# Patient Record
Sex: FEMALE | Race: ASIAN | NOT HISPANIC OR LATINO | URBAN - METROPOLITAN AREA
[De-identification: names, ages, dates, MRNs, and addresses within clinical notes are randomized per-mention and may not be internally consistent; named-entity substitution may affect disease eponyms.]

---

## 2019-11-26 ENCOUNTER — INPATIENT (INPATIENT)
Facility: HOSPITAL | Age: 74
LOS: 2 days | Discharge: HOME HEALTH | End: 2019-11-29
Attending: INTERNAL MEDICINE | Admitting: INTERNAL MEDICINE
Payer: MEDICARE

## 2019-11-26 VITALS
OXYGEN SATURATION: 100 % | DIASTOLIC BLOOD PRESSURE: 63 MMHG | SYSTOLIC BLOOD PRESSURE: 160 MMHG | HEART RATE: 84 BPM | RESPIRATION RATE: 16 BRPM | TEMPERATURE: 98 F

## 2019-11-26 DIAGNOSIS — E11.9 TYPE 2 DIABETES MELLITUS WITHOUT COMPLICATIONS: ICD-10-CM

## 2019-11-26 DIAGNOSIS — M86.9 OSTEOMYELITIS, UNSPECIFIED: ICD-10-CM

## 2019-11-26 LAB
ALBUMIN SERPL ELPH-MCNC: 4.5 G/DL — SIGNIFICANT CHANGE UP (ref 3.3–5)
ALP SERPL-CCNC: 61 U/L — SIGNIFICANT CHANGE UP (ref 40–120)
ALT FLD-CCNC: 14 U/L — SIGNIFICANT CHANGE UP (ref 4–33)
ANION GAP SERPL CALC-SCNC: 12 MMO/L — SIGNIFICANT CHANGE UP (ref 7–14)
AST SERPL-CCNC: 18 U/L — SIGNIFICANT CHANGE UP (ref 4–32)
BASOPHILS # BLD AUTO: 0.03 K/UL — SIGNIFICANT CHANGE UP (ref 0–0.2)
BASOPHILS NFR BLD AUTO: 0.4 % — SIGNIFICANT CHANGE UP (ref 0–2)
BILIRUB SERPL-MCNC: 0.4 MG/DL — SIGNIFICANT CHANGE UP (ref 0.2–1.2)
BUN SERPL-MCNC: 15 MG/DL — SIGNIFICANT CHANGE UP (ref 7–23)
CALCIUM SERPL-MCNC: 10.1 MG/DL — SIGNIFICANT CHANGE UP (ref 8.4–10.5)
CHLORIDE SERPL-SCNC: 100 MMOL/L — SIGNIFICANT CHANGE UP (ref 98–107)
CO2 SERPL-SCNC: 26 MMOL/L — SIGNIFICANT CHANGE UP (ref 22–31)
CREAT SERPL-MCNC: 0.69 MG/DL — SIGNIFICANT CHANGE UP (ref 0.5–1.3)
CRP SERPL-MCNC: < 4 MG/L — SIGNIFICANT CHANGE UP
EOSINOPHIL # BLD AUTO: 0.04 K/UL — SIGNIFICANT CHANGE UP (ref 0–0.5)
EOSINOPHIL NFR BLD AUTO: 0.6 % — SIGNIFICANT CHANGE UP (ref 0–6)
ERYTHROCYTE [SEDIMENTATION RATE] IN BLOOD: 51 MM/HR — HIGH (ref 4–25)
GLUCOSE BLDC GLUCOMTR-MCNC: 195 MG/DL — HIGH (ref 70–99)
GLUCOSE BLDC GLUCOMTR-MCNC: 221 MG/DL — HIGH (ref 70–99)
GLUCOSE BLDC GLUCOMTR-MCNC: 62 MG/DL — LOW (ref 70–99)
GLUCOSE BLDC GLUCOMTR-MCNC: 64 MG/DL — LOW (ref 70–99)
GLUCOSE BLDC GLUCOMTR-MCNC: 65 MG/DL — LOW (ref 70–99)
GLUCOSE BLDC GLUCOMTR-MCNC: 65 MG/DL — LOW (ref 70–99)
GLUCOSE BLDC GLUCOMTR-MCNC: 67 MG/DL — LOW (ref 70–99)
GLUCOSE BLDC GLUCOMTR-MCNC: 75 MG/DL — SIGNIFICANT CHANGE UP (ref 70–99)
GLUCOSE BLDC GLUCOMTR-MCNC: 78 MG/DL — SIGNIFICANT CHANGE UP (ref 70–99)
GLUCOSE SERPL-MCNC: 133 MG/DL — HIGH (ref 70–99)
HCT VFR BLD CALC: 36.3 % — SIGNIFICANT CHANGE UP (ref 34.5–45)
HGB BLD-MCNC: 11.8 G/DL — SIGNIFICANT CHANGE UP (ref 11.5–15.5)
IMM GRANULOCYTES NFR BLD AUTO: 0.3 % — SIGNIFICANT CHANGE UP (ref 0–1.5)
LACTATE SERPL-SCNC: 1.3 MMOL/L — SIGNIFICANT CHANGE UP (ref 0.5–2)
LYMPHOCYTES # BLD AUTO: 1.14 K/UL — SIGNIFICANT CHANGE UP (ref 1–3.3)
LYMPHOCYTES # BLD AUTO: 17 % — SIGNIFICANT CHANGE UP (ref 13–44)
MCHC RBC-ENTMCNC: 30.3 PG — SIGNIFICANT CHANGE UP (ref 27–34)
MCHC RBC-ENTMCNC: 32.5 % — SIGNIFICANT CHANGE UP (ref 32–36)
MCV RBC AUTO: 93.1 FL — SIGNIFICANT CHANGE UP (ref 80–100)
MONOCYTES # BLD AUTO: 0.32 K/UL — SIGNIFICANT CHANGE UP (ref 0–0.9)
MONOCYTES NFR BLD AUTO: 4.8 % — SIGNIFICANT CHANGE UP (ref 2–14)
NEUTROPHILS # BLD AUTO: 5.14 K/UL — SIGNIFICANT CHANGE UP (ref 1.8–7.4)
NEUTROPHILS NFR BLD AUTO: 76.9 % — SIGNIFICANT CHANGE UP (ref 43–77)
NRBC # FLD: 0 K/UL — SIGNIFICANT CHANGE UP (ref 0–0)
PLATELET # BLD AUTO: 225 K/UL — SIGNIFICANT CHANGE UP (ref 150–400)
PMV BLD: 9.9 FL — SIGNIFICANT CHANGE UP (ref 7–13)
POTASSIUM SERPL-MCNC: 4.6 MMOL/L — SIGNIFICANT CHANGE UP (ref 3.5–5.3)
POTASSIUM SERPL-SCNC: 4.6 MMOL/L — SIGNIFICANT CHANGE UP (ref 3.5–5.3)
PROT SERPL-MCNC: 8.7 G/DL — HIGH (ref 6–8.3)
RBC # BLD: 3.9 M/UL — SIGNIFICANT CHANGE UP (ref 3.8–5.2)
RBC # FLD: 12.8 % — SIGNIFICANT CHANGE UP (ref 10.3–14.5)
SODIUM SERPL-SCNC: 138 MMOL/L — SIGNIFICANT CHANGE UP (ref 135–145)
WBC # BLD: 6.69 K/UL — SIGNIFICANT CHANGE UP (ref 3.8–10.5)
WBC # FLD AUTO: 6.69 K/UL — SIGNIFICANT CHANGE UP (ref 3.8–10.5)

## 2019-11-26 PROCEDURE — 73130 X-RAY EXAM OF HAND: CPT | Mod: 26,RT

## 2019-11-26 RX ORDER — ENOXAPARIN SODIUM 100 MG/ML
40 INJECTION SUBCUTANEOUS DAILY
Refills: 0 | Status: DISCONTINUED | OUTPATIENT
Start: 2019-11-26 | End: 2019-11-29

## 2019-11-26 RX ORDER — SODIUM CHLORIDE 9 MG/ML
1000 INJECTION, SOLUTION INTRAVENOUS
Refills: 0 | Status: DISCONTINUED | OUTPATIENT
Start: 2019-11-26 | End: 2019-11-29

## 2019-11-26 RX ORDER — INSULIN LISPRO 100/ML
VIAL (ML) SUBCUTANEOUS AT BEDTIME
Refills: 0 | Status: DISCONTINUED | OUTPATIENT
Start: 2019-11-26 | End: 2019-11-29

## 2019-11-26 RX ORDER — PIPERACILLIN AND TAZOBACTAM 4; .5 G/20ML; G/20ML
3.38 INJECTION, POWDER, LYOPHILIZED, FOR SOLUTION INTRAVENOUS EVERY 8 HOURS
Refills: 0 | Status: DISCONTINUED | OUTPATIENT
Start: 2019-11-26 | End: 2019-11-28

## 2019-11-26 RX ORDER — INSULIN LISPRO 100/ML
15 VIAL (ML) SUBCUTANEOUS
Refills: 0 | Status: DISCONTINUED | OUTPATIENT
Start: 2019-11-26 | End: 2019-11-27

## 2019-11-26 RX ORDER — DEXTROSE 50 % IN WATER 50 %
25 SYRINGE (ML) INTRAVENOUS ONCE
Refills: 0 | Status: DISCONTINUED | OUTPATIENT
Start: 2019-11-26 | End: 2019-11-29

## 2019-11-26 RX ORDER — VANCOMYCIN HCL 1 G
1000 VIAL (EA) INTRAVENOUS ONCE
Refills: 0 | Status: COMPLETED | OUTPATIENT
Start: 2019-11-26 | End: 2019-11-26

## 2019-11-26 RX ORDER — DEXTROSE 50 % IN WATER 50 %
15 SYRINGE (ML) INTRAVENOUS ONCE
Refills: 0 | Status: DISCONTINUED | OUTPATIENT
Start: 2019-11-26 | End: 2019-11-29

## 2019-11-26 RX ORDER — ACETAMINOPHEN 500 MG
650 TABLET ORAL ONCE
Refills: 0 | Status: COMPLETED | OUTPATIENT
Start: 2019-11-26 | End: 2019-11-26

## 2019-11-26 RX ORDER — DEXTROSE 50 % IN WATER 50 %
12.5 SYRINGE (ML) INTRAVENOUS ONCE
Refills: 0 | Status: DISCONTINUED | OUTPATIENT
Start: 2019-11-26 | End: 2019-11-29

## 2019-11-26 RX ORDER — BACITRACIN ZINC 500 UNIT/G
1 OINTMENT IN PACKET (EA) TOPICAL THREE TIMES A DAY
Refills: 0 | Status: DISCONTINUED | OUTPATIENT
Start: 2019-11-26 | End: 2019-11-29

## 2019-11-26 RX ORDER — PIPERACILLIN AND TAZOBACTAM 4; .5 G/20ML; G/20ML
3.38 INJECTION, POWDER, LYOPHILIZED, FOR SOLUTION INTRAVENOUS ONCE
Refills: 0 | Status: COMPLETED | OUTPATIENT
Start: 2019-11-26 | End: 2019-11-26

## 2019-11-26 RX ORDER — DEXTROSE 50 % IN WATER 50 %
15 SYRINGE (ML) INTRAVENOUS ONCE
Refills: 0 | Status: COMPLETED | OUTPATIENT
Start: 2019-11-26 | End: 2019-11-26

## 2019-11-26 RX ORDER — ATORVASTATIN CALCIUM 80 MG/1
20 TABLET, FILM COATED ORAL AT BEDTIME
Refills: 0 | Status: DISCONTINUED | OUTPATIENT
Start: 2019-11-26 | End: 2019-11-29

## 2019-11-26 RX ORDER — GLUCAGON INJECTION, SOLUTION 0.5 MG/.1ML
1 INJECTION, SOLUTION SUBCUTANEOUS ONCE
Refills: 0 | Status: DISCONTINUED | OUTPATIENT
Start: 2019-11-26 | End: 2019-11-29

## 2019-11-26 RX ORDER — SODIUM CHLORIDE 9 MG/ML
1000 INJECTION INTRAMUSCULAR; INTRAVENOUS; SUBCUTANEOUS ONCE
Refills: 0 | Status: COMPLETED | OUTPATIENT
Start: 2019-11-26 | End: 2019-11-26

## 2019-11-26 RX ORDER — ASPIRIN/CALCIUM CARB/MAGNESIUM 324 MG
81 TABLET ORAL DAILY
Refills: 0 | Status: DISCONTINUED | OUTPATIENT
Start: 2019-11-26 | End: 2019-11-29

## 2019-11-26 RX ORDER — INSULIN GLARGINE 100 [IU]/ML
25 INJECTION, SOLUTION SUBCUTANEOUS AT BEDTIME
Refills: 0 | Status: DISCONTINUED | OUTPATIENT
Start: 2019-11-26 | End: 2019-11-27

## 2019-11-26 RX ORDER — INSULIN LISPRO 100/ML
VIAL (ML) SUBCUTANEOUS
Refills: 0 | Status: DISCONTINUED | OUTPATIENT
Start: 2019-11-26 | End: 2019-11-29

## 2019-11-26 RX ORDER — VANCOMYCIN HCL 1 G
1000 VIAL (EA) INTRAVENOUS EVERY 12 HOURS
Refills: 0 | Status: DISCONTINUED | OUTPATIENT
Start: 2019-11-26 | End: 2019-11-28

## 2019-11-26 RX ADMIN — Medication 2: at 18:47

## 2019-11-26 RX ADMIN — Medication 1 APPLICATION(S): at 22:11

## 2019-11-26 RX ADMIN — SODIUM CHLORIDE 1000 MILLILITER(S): 9 INJECTION INTRAMUSCULAR; INTRAVENOUS; SUBCUTANEOUS at 12:00

## 2019-11-26 RX ADMIN — Medication 250 MILLIGRAM(S): at 12:00

## 2019-11-26 RX ADMIN — Medication 81 MILLIGRAM(S): at 22:11

## 2019-11-26 RX ADMIN — Medication 1000 MILLIGRAM(S): at 12:00

## 2019-11-26 RX ADMIN — PIPERACILLIN AND TAZOBACTAM 200 GRAM(S): 4; .5 INJECTION, POWDER, LYOPHILIZED, FOR SOLUTION INTRAVENOUS at 18:13

## 2019-11-26 RX ADMIN — Medication 15 UNIT(S): at 18:46

## 2019-11-26 RX ADMIN — ENOXAPARIN SODIUM 40 MILLIGRAM(S): 100 INJECTION SUBCUTANEOUS at 22:11

## 2019-11-26 RX ADMIN — Medication 15 GRAM(S): at 22:06

## 2019-11-26 RX ADMIN — ATORVASTATIN CALCIUM 20 MILLIGRAM(S): 80 TABLET, FILM COATED ORAL at 22:12

## 2019-11-26 NOTE — ED PROVIDER NOTE - OBJECTIVE STATEMENT
75yo F h/o DM pw infected right second digit. ot started having swelling and redness to distal phalanx about 1 month ago. was placed on a course of cefuroxime for 7 days and had incision and drainage of ?paronychia. no improvement of infection, was then placed on course of doxycycline, again with no improvement. pt had another I and d yesterday with drainage of blood and pus and started on keflex, also had xr yesterday. received called today with xray report saying extensive OM and pt was told by pmd Dr Arvizu to go to ED. denies fevers, chills or other systemic symptoms

## 2019-11-26 NOTE — ED ADULT NURSE NOTE - EXTENSIONS OF SELF_ADULT
None Graft Donor Site Bandage (Optional-Leave Blank If You Don't Want In Note): Steri-strips and a pressure bandage were applied to the donor site.

## 2019-11-26 NOTE — ED ADULT NURSE NOTE - CHIEF COMPLAINT QUOTE
states" I have an [id number] to my right hand 2 finger since many days and used antibiotics several times and my doctor sent me in with Osteomyelitis. h/o DM.

## 2019-11-26 NOTE — CONSULT NOTE ADULT - SUBJECTIVE AND OBJECTIVE BOX
Patient is a 74y old  Female who presents with a chief complaint of osteomyelitis of the finger (26 Nov 2019 17:14)      HPI:    73yo F h/o DM pw infected right second digit. Pt started having swelling and redness to distal phalanx about 1 month ago. was placed on a course of cefuroxime for 7 days (Oct 21) and had incision and drainage of ?paronychia. no improvement of infection, was then placed on course of doxycycline, again with no improvement. pt had another I and d yesterday with drainage of blood and pus and started on keflex, also had xr yesterday. received called today with xray report saying extensive OM and pt was told by pmd Dr Arvizu to go to ED. denies fevers, chills or other systemic symptoms (26 Nov 2019 17:14)    Pt seen by Plastics, wound was expressed with serosanguinous fluid.   Xray of digit s/o OM.  Pt started on vanco/zosyn.  ID consult called for further abx management.  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  She also does gardening and thinks she may have been pricked by a thorn.        REVIEW OF SYSTEMS:    CONSTITUTIONAL: No fever, weight loss, or fatigue  EYES: No eye pain, visual disturbances, or discharge  ENMT:  No sore throat  NECK: No pain or stiffness  RESPIRATORY: No cough, wheezing, chills or hemoptysis; No shortness of breath  CARDIOVASCULAR: No chest pain, palpitations, dizziness, or leg swelling  GASTROINTESTINAL: No abdominal or epigastric pain. No nausea, vomiting, or hematemesis; No diarrhea or constipation. No melena or hematochezia.  GENITOURINARY: No dysuria, frequency, hematuria, or incontinence  NEUROLOGICAL: No headaches, memory loss, loss of strength, numbness, or tremors  SKIN: No itching, burning, rashes, or lesions   LYMPH NODES: No enlarged glands  MUSCULOSKELETAL: No joint pain or swelling; No muscle, back, or extremity pain      PAST MEDICAL & SURGICAL HISTORY:  Diabetes  No significant past surgical history      Allergies    No Known Allergies    Intolerances        FAMILY HISTORY:  No pertinent family history in first degree relatives      SOCIAL HISTORY:    No IVDU, smoking or etoh      MEDICATIONS  (STANDING):  aspirin enteric coated 81 milliGRAM(s) Oral daily  atorvastatin 20 milliGRAM(s) Oral at bedtime  BACItracin   Ointment 1 Application(s) Topical three times a day  dextrose 5%. 1000 milliLiter(s) (50 mL/Hr) IV Continuous <Continuous>  dextrose 50% Injectable 12.5 Gram(s) IV Push once  dextrose 50% Injectable 25 Gram(s) IV Push once  dextrose 50% Injectable 25 Gram(s) IV Push once  enoxaparin Injectable 40 milliGRAM(s) SubCutaneous daily  insulin glargine Injectable (LANTUS) 25 Unit(s) SubCutaneous at bedtime  insulin lispro (HumaLOG) corrective regimen sliding scale   SubCutaneous three times a day before meals  insulin lispro (HumaLOG) corrective regimen sliding scale   SubCutaneous at bedtime  insulin lispro Injectable (HumaLOG) 15 Unit(s) SubCutaneous three times a day before meals  piperacillin/tazobactam IVPB.. 3.375 Gram(s) IV Intermittent every 8 hours  vancomycin  IVPB 1000 milliGRAM(s) IV Intermittent every 12 hours    MEDICATIONS  (PRN):  dextrose 40% Gel 15 Gram(s) Oral once PRN Blood Glucose LESS THAN 70 milliGRAM(s)/deciliter  glucagon  Injectable 1 milliGRAM(s) IntraMuscular once PRN Glucose LESS THAN 70 milligrams/deciliter      Vital Signs Last 24 Hrs  T(C): 36.8 (26 Nov 2019 21:06), Max: 36.9 (26 Nov 2019 15:50)  T(F): 98.3 (26 Nov 2019 21:06), Max: 98.4 (26 Nov 2019 15:50)  HR: 76 (26 Nov 2019 21:06) (76 - 84)  BP: 140/74 (26 Nov 2019 21:06) (140/74 - 160/63)  BP(mean): --  RR: 18 (26 Nov 2019 21:06) (16 - 183)  SpO2: 100% (26 Nov 2019 21:06) (100% - 100%)    PHYSICAL EXAM:    GENERAL: NAD, well-groomed  HEAD:  Atraumatic, Normocephalic  EYES: EOMI, PERRLA, conjunctiva and sclera clear  ENMT: No tonsillar erythema, exudates, or enlargement; Moist mucous membranes  NECK: Supple, No JVD  CHEST/LUNG: Clear to percussion bilaterally; No rales, rhonchi, wheezing, or rubs  HEART: Regular rate and rhythm; No murmurs, rubs, or gallops  ABDOMEN: Soft, Nontender, Nondistended; Bowel sounds present  EXTREMITIES:  2+ Peripheral Pulses, No clubbing, cyanosis, or edema  LYMPH: No lymphadenopathy noted  SKIN: Rt hand 2nd digit with swelling at distal tip of finger.  Nail bed discolored, appearance of mild pus underneath nailbed.     LABS:  CBC Full  -  ( 26 Nov 2019 11:45 )  WBC Count : 6.69 K/uL  RBC Count : 3.90 M/uL  Hemoglobin : 11.8 g/dL  Hematocrit : 36.3 %  Platelet Count - Automated : 225 K/uL  Mean Cell Volume : 93.1 fL  Mean Cell Hemoglobin : 30.3 pg  Mean Cell Hemoglobin Concentration : 32.5 %  Auto Neutrophil # : 5.14 K/uL  Auto Lymphocyte # : 1.14 K/uL  Auto Monocyte # : 0.32 K/uL  Auto Eosinophil # : 0.04 K/uL  Auto Basophil # : 0.03 K/uL  Auto Neutrophil % : 76.9 %  Auto Lymphocyte % : 17.0 %  Auto Monocyte % : 4.8 %  Auto Eosinophil % : 0.6 %  Auto Basophil % : 0.4 %      11-26    138  |  100  |  15  ----------------------------<  133<H>  4.6   |  26  |  0.69    Ca    10.1      26 Nov 2019 11:45    TPro  8.7<H>  /  Alb  4.5  /  TBili  0.4  /  DBili  x   /  AST  18  /  ALT  14  /  AlkPhos  61  11-26      LIVER FUNCTIONS - ( 26 Nov 2019 11:45 )  Alb: 4.5 g/dL / Pro: 8.7 g/dL / ALK PHOS: 61 u/L / ALT: 14 u/L / AST: 18 u/L / GGT: x                               MICROBIOLOGY:                    RADIOLOGY:    < from: Xray Hand 3 Views, Right (11.26.19 @ 12:30) >  IMPRESSION:  Index finger tip soft tissue swelling. No tracking gas collections or   radiopaque foreign bodies.    Eroded and indistinct appearing cortical margins of the underlying distal   phalanx with suspected pathologic fracture/fragmentation at the tuft   level suspicious for osteomyelitis in the proper clinical context. No   additional suspected areas of osteomyelitis.    Intact and normally aligned remaining osseous structures and   articulations.     Preserved joint spaces and no joint margin erosions.    Carpal bones normally aligned.    Neutral ulnar variance.    No lytic or blastic lesions.    < end of copied text >

## 2019-11-26 NOTE — PROGRESS NOTE ADULT - SUBJECTIVE AND OBJECTIVE BOX
Right IF osteomyelitis based on history and exam    have discussed amputation with family as there is open wound and drainage from nailfold  They would like further workup with MRI and ID eval  For now rec soaks TID in warm soapy water  Dressing with bacitracin and dry gauze    Elevation

## 2019-11-26 NOTE — ED ADULT NURSE NOTE - NSIMPLEMENTINTERV_GEN_ALL_ED
Implemented All Universal Safety Interventions:  [place] to call system. Call bell, personal items and telephone within reach. Instruct patient to call for assistance. Room bathroom lighting operational. Non-slip footwear when patient is off stretcher. Physically safe environment: no spills, clutter or unnecessary equipment. Stretcher in lowest position, wheels locked, appropriate side rails in place.

## 2019-11-26 NOTE — H&P ADULT - ASSESSMENT
73yo F h/o DM pw infected right second digit. ot started having swelling and redness to distal phalanx about 1 month ago. was placed on a course of cefuroxime for 7 days and had incision and drainage of ?paronychia. no improvement of infection, was then placed on course of doxycycline, again with no improvement. pt had another I and d yesterday with drainage of blood and pus and started on keflex, also had xr yesterday. received called today with xray report saying extensive OM and pt was told by pmd Dr Arvizu to go to ED. denies fevers, chills or other systemic symptoms

## 2019-11-26 NOTE — ED PROVIDER NOTE - CLINICAL SUMMARY MEDICAL DECISION MAKING FREE TEXT BOX
73yo F with DM, sent in for OM of [facility] second digit, s/p 2+ course of PO abx   will getl abs, cx, xr, abx, hand, admit

## 2019-11-26 NOTE — H&P ADULT - NSHPPHYSICALEXAM_GEN_ALL_CORE
General: WN/WD NAD  PERRLA  Neurology: A&Ox3, nonfocal, YEN x 4  Respiratory: CTA B/L  CV: RRR, S1S2, no murmurs, rubs or gallops  Abdominal: Soft, NT, ND +BS, Last BM  Extremities: No edema, + peripheral pulses  Skin Normal

## 2019-11-26 NOTE — ED ADULT NURSE NOTE - OBJECTIVE STATEMENT
Pt received in 10B.  AOX4, skin warm, dry.  Pt presents with infection to right index finger since "October".  Pt endorses "two rounds of antibiotics".  IV access obtained.  Labs drawn and sent.  IVF and antibiotics in progress

## 2019-11-26 NOTE — ED PROVIDER NOTE - PHYSICAL EXAMINATION
Gen: Well appearing in NAD  Head: NC/AT  Neck: trachea midline  Resp:  No distress  Ext: no deformities  Neuro:  A&O appears non focal  Skin:  Warm and dry as visualized  Psych:  Normal affect and mood  right hand - + swelling, redness and tenderness of distal phalanx, can fully flex and extend digit without pain

## 2019-11-26 NOTE — ED ADULT TRIAGE NOTE - CHIEF COMPLAINT QUOTE
states" I have an [id number] to my right hand 2 finger since many days and used antibiotics several times and my doctor sent me in with Osteomyelitis. h/o DM.

## 2019-11-26 NOTE — H&P ADULT - NSHPLABSRESULTS_GEN_ALL_CORE
Lab Results:  CBC  CBC Full  -  ( 26 Nov 2019 11:45 )  WBC Count : 6.69 K/uL  RBC Count : 3.90 M/uL  Hemoglobin : 11.8 g/dL  Hematocrit : 36.3 %  Platelet Count - Automated : 225 K/uL  Mean Cell Volume : 93.1 fL  Mean Cell Hemoglobin : 30.3 pg  Mean Cell Hemoglobin Concentration : 32.5 %  Auto Neutrophil # : 5.14 K/uL  Auto Lymphocyte # : 1.14 K/uL  Auto Monocyte # : 0.32 K/uL  Auto Eosinophil # : 0.04 K/uL  Auto Basophil # : 0.03 K/uL  Auto Neutrophil % : 76.9 %  Auto Lymphocyte % : 17.0 %  Auto Monocyte % : 4.8 %  Auto Eosinophil % : 0.6 %  Auto Basophil % : 0.4 %    .		Differential:	[] Automated		[] Manual  Chemistry                        11.8   6.69  )-----------( 225      ( 26 Nov 2019 11:45 )             36.3     11-26    138  |  100  |  15  ----------------------------<  133<H>  4.6   |  26  |  0.69    Ca    10.1      26 Nov 2019 11:45    TPro  8.7<H>  /  Alb  4.5  /  TBili  0.4  /  DBili  x   /  AST  18  /  ALT  14  /  AlkPhos  61  11-26    LIVER FUNCTIONS - ( 26 Nov 2019 11:45 )  Alb: 4.5 g/dL / Pro: 8.7 g/dL / ALK PHOS: 61 u/L / ALT: 14 u/L / AST: 18 u/L / GGT: x                     MICROBIOLOGY/CULTURES:      RADIOLOGY RESULTS: reviewed

## 2019-11-26 NOTE — ED PROVIDER NOTE - PROGRESS NOTE DETAILS
seen by hand, admit for iv abx, possible amputation. spoke with dr olea requesting admission to Dr Venu Lee

## 2019-11-26 NOTE — PATIENT PROFILE ADULT - PRIMARY ROLES/RESPONSIBILITIES
----- Message from Fatoumata Bird MD sent at 4/11/2019 11:54 AM CDT -----  Please notify pt of low vitamin D level-recommend vitamin D3 -5000 units daily.HCG and progesterone levels are wnl for 1st trimester pregnancy. Remainder of her labs and cultures are wnl.  Thanks   retired

## 2019-11-27 ENCOUNTER — TRANSCRIPTION ENCOUNTER (OUTPATIENT)
Age: 74
End: 2019-11-27

## 2019-11-27 DIAGNOSIS — E78.5 HYPERLIPIDEMIA, UNSPECIFIED: ICD-10-CM

## 2019-11-27 DIAGNOSIS — E11.649 TYPE 2 DIABETES MELLITUS WITH HYPOGLYCEMIA WITHOUT COMA: ICD-10-CM

## 2019-11-27 DIAGNOSIS — I10 ESSENTIAL (PRIMARY) HYPERTENSION: ICD-10-CM

## 2019-11-27 LAB
ALBUMIN SERPL ELPH-MCNC: 3.7 G/DL — SIGNIFICANT CHANGE UP (ref 3.3–5)
ALP SERPL-CCNC: 45 U/L — SIGNIFICANT CHANGE UP (ref 40–120)
ALT FLD-CCNC: 8 U/L — SIGNIFICANT CHANGE UP (ref 4–33)
ANION GAP SERPL CALC-SCNC: 11 MMO/L — SIGNIFICANT CHANGE UP (ref 7–14)
ANION GAP SERPL CALC-SCNC: 13 MMO/L — SIGNIFICANT CHANGE UP (ref 7–14)
AST SERPL-CCNC: 16 U/L — SIGNIFICANT CHANGE UP (ref 4–32)
BILIRUB SERPL-MCNC: 0.3 MG/DL — SIGNIFICANT CHANGE UP (ref 0.2–1.2)
BUN SERPL-MCNC: 13 MG/DL — SIGNIFICANT CHANGE UP (ref 7–23)
BUN SERPL-MCNC: 18 MG/DL — SIGNIFICANT CHANGE UP (ref 7–23)
CALCIUM SERPL-MCNC: 9.3 MG/DL — SIGNIFICANT CHANGE UP (ref 8.4–10.5)
CALCIUM SERPL-MCNC: 9.6 MG/DL — SIGNIFICANT CHANGE UP (ref 8.4–10.5)
CHLORIDE SERPL-SCNC: 104 MMOL/L — SIGNIFICANT CHANGE UP (ref 98–107)
CHLORIDE SERPL-SCNC: 104 MMOL/L — SIGNIFICANT CHANGE UP (ref 98–107)
CHOLEST SERPL-MCNC: 162 MG/DL — SIGNIFICANT CHANGE UP (ref 120–199)
CO2 SERPL-SCNC: 26 MMOL/L — SIGNIFICANT CHANGE UP (ref 22–31)
CO2 SERPL-SCNC: 26 MMOL/L — SIGNIFICANT CHANGE UP (ref 22–31)
CREAT SERPL-MCNC: 0.73 MG/DL — SIGNIFICANT CHANGE UP (ref 0.5–1.3)
CREAT SERPL-MCNC: 0.88 MG/DL — SIGNIFICANT CHANGE UP (ref 0.5–1.3)
GLUCOSE BLDC GLUCOMTR-MCNC: 125 MG/DL — HIGH (ref 70–99)
GLUCOSE BLDC GLUCOMTR-MCNC: 151 MG/DL — HIGH (ref 70–99)
GLUCOSE BLDC GLUCOMTR-MCNC: 185 MG/DL — HIGH (ref 70–99)
GLUCOSE BLDC GLUCOMTR-MCNC: 220 MG/DL — HIGH (ref 70–99)
GLUCOSE BLDC GLUCOMTR-MCNC: 290 MG/DL — HIGH (ref 70–99)
GLUCOSE SERPL-MCNC: 129 MG/DL — HIGH (ref 70–99)
GLUCOSE SERPL-MCNC: 75 MG/DL — SIGNIFICANT CHANGE UP (ref 70–99)
HBA1C BLD-MCNC: 7.7 % — HIGH (ref 4–5.6)
HCT VFR BLD CALC: 31.2 % — LOW (ref 34.5–45)
HCV AB S/CO SERPL IA: 0.11 S/CO — SIGNIFICANT CHANGE UP (ref 0–0.99)
HCV AB SERPL-IMP: SIGNIFICANT CHANGE UP
HDLC SERPL-MCNC: 74 MG/DL — HIGH (ref 45–65)
HGB BLD-MCNC: 9.9 G/DL — LOW (ref 11.5–15.5)
LIPID PNL WITH DIRECT LDL SERPL: 84 MG/DL — SIGNIFICANT CHANGE UP
MCHC RBC-ENTMCNC: 29.5 PG — SIGNIFICANT CHANGE UP (ref 27–34)
MCHC RBC-ENTMCNC: 31.7 % — LOW (ref 32–36)
MCV RBC AUTO: 92.9 FL — SIGNIFICANT CHANGE UP (ref 80–100)
NRBC # FLD: 0 K/UL — SIGNIFICANT CHANGE UP (ref 0–0)
PLATELET # BLD AUTO: 209 K/UL — SIGNIFICANT CHANGE UP (ref 150–400)
PMV BLD: 10.3 FL — SIGNIFICANT CHANGE UP (ref 7–13)
POTASSIUM SERPL-MCNC: 3.8 MMOL/L — SIGNIFICANT CHANGE UP (ref 3.5–5.3)
POTASSIUM SERPL-MCNC: 3.9 MMOL/L — SIGNIFICANT CHANGE UP (ref 3.5–5.3)
POTASSIUM SERPL-SCNC: 3.8 MMOL/L — SIGNIFICANT CHANGE UP (ref 3.5–5.3)
POTASSIUM SERPL-SCNC: 3.9 MMOL/L — SIGNIFICANT CHANGE UP (ref 3.5–5.3)
PROT SERPL-MCNC: 6.7 G/DL — SIGNIFICANT CHANGE UP (ref 6–8.3)
RBC # BLD: 3.36 M/UL — LOW (ref 3.8–5.2)
RBC # FLD: 12.8 % — SIGNIFICANT CHANGE UP (ref 10.3–14.5)
SODIUM SERPL-SCNC: 141 MMOL/L — SIGNIFICANT CHANGE UP (ref 135–145)
SODIUM SERPL-SCNC: 143 MMOL/L — SIGNIFICANT CHANGE UP (ref 135–145)
SPECIMEN SOURCE: SIGNIFICANT CHANGE UP
SPECIMEN SOURCE: SIGNIFICANT CHANGE UP
TRIGL SERPL-MCNC: 69 MG/DL — SIGNIFICANT CHANGE UP (ref 10–149)
VANCOMYCIN TROUGH SERPL-MCNC: 13.6 UG/ML — SIGNIFICANT CHANGE UP (ref 10–20)
WBC # BLD: 6.18 K/UL — SIGNIFICANT CHANGE UP (ref 3.8–10.5)
WBC # FLD AUTO: 6.18 K/UL — SIGNIFICANT CHANGE UP (ref 3.8–10.5)

## 2019-11-27 PROCEDURE — 73219 MRI UPPER EXTREMITY W/DYE: CPT | Mod: 26,RT

## 2019-11-27 PROCEDURE — 99222 1ST HOSP IP/OBS MODERATE 55: CPT | Mod: GC

## 2019-11-27 RX ORDER — INSULIN GLARGINE 100 [IU]/ML
10 INJECTION, SOLUTION SUBCUTANEOUS AT BEDTIME
Refills: 0 | Status: DISCONTINUED | OUTPATIENT
Start: 2019-11-27 | End: 2019-11-29

## 2019-11-27 RX ORDER — SODIUM CHLORIDE 9 MG/ML
1000 INJECTION, SOLUTION INTRAVENOUS
Refills: 0 | Status: DISCONTINUED | OUTPATIENT
Start: 2019-11-27 | End: 2019-11-29

## 2019-11-27 RX ADMIN — Medication 1 APPLICATION(S): at 14:52

## 2019-11-27 RX ADMIN — ATORVASTATIN CALCIUM 20 MILLIGRAM(S): 80 TABLET, FILM COATED ORAL at 21:09

## 2019-11-27 RX ADMIN — Medication 250 MILLIGRAM(S): at 13:38

## 2019-11-27 RX ADMIN — Medication 1 APPLICATION(S): at 05:05

## 2019-11-27 RX ADMIN — Medication 1 APPLICATION(S): at 21:09

## 2019-11-27 RX ADMIN — Medication 81 MILLIGRAM(S): at 13:38

## 2019-11-27 RX ADMIN — Medication 2: at 17:56

## 2019-11-27 RX ADMIN — Medication 250 MILLIGRAM(S): at 00:16

## 2019-11-27 RX ADMIN — INSULIN GLARGINE 10 UNIT(S): 100 INJECTION, SOLUTION SUBCUTANEOUS at 22:47

## 2019-11-27 RX ADMIN — PIPERACILLIN AND TAZOBACTAM 25 GRAM(S): 4; .5 INJECTION, POWDER, LYOPHILIZED, FOR SOLUTION INTRAVENOUS at 05:04

## 2019-11-27 RX ADMIN — ENOXAPARIN SODIUM 40 MILLIGRAM(S): 100 INJECTION SUBCUTANEOUS at 21:09

## 2019-11-27 RX ADMIN — Medication 1: at 13:38

## 2019-11-27 RX ADMIN — PIPERACILLIN AND TAZOBACTAM 25 GRAM(S): 4; .5 INJECTION, POWDER, LYOPHILIZED, FOR SOLUTION INTRAVENOUS at 14:52

## 2019-11-27 RX ADMIN — Medication 1: at 22:47

## 2019-11-27 RX ADMIN — SODIUM CHLORIDE 50 MILLILITER(S): 9 INJECTION, SOLUTION INTRAVENOUS at 01:16

## 2019-11-27 RX ADMIN — PIPERACILLIN AND TAZOBACTAM 25 GRAM(S): 4; .5 INJECTION, POWDER, LYOPHILIZED, FOR SOLUTION INTRAVENOUS at 21:08

## 2019-11-27 NOTE — PROVIDER CONTACT NOTE (OTHER) - SITUATION
Pt glucose checked 2129 for bedtime insulin therapy, 64 result. Hypoglycemia protocol started, dextrose gel given as well as an apple juice. Waited 15 mins & BG 65, PA made aware at this time.

## 2019-11-27 NOTE — PROGRESS NOTE ADULT - ASSESSMENT
75yo F h/o DM pw infected right second digit. Pt started having swelling and redness to distal phalanx about 1 month ago. was placed on a course of cefuroxime for 7 days (Oct 21) and had incision and drainage of ?paronychia. no improvement of infection, was then placed on course of doxycycline, again with no improvement. pt had another I and d yesterday with drainage of blood and pus and started on keflex, also had xr yesterday. received called today with xray report saying extensive OM and pt was told by pmd Dr Arvizu to go to ED. denies fevers, chills or other systemic symptoms (26 Nov 2019 17:14)    Pt seen by Plastics, wound was expressed with serosanguinous fluid.   Xray of digit s/o OM.  Pt started on vanco/zosyn.  ID consult called for further abx management.  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  She also does gardening and thinks she may have been pricked by a thorn.    Rt hand 2nd digit OM:    - Pt with chronic wound of 2nd digit, s/p multiple courses of abx without improvement.  Xray s/o OM.    - Recommend continuing vanco/zosyn.  f/u MRI of rt hand digit - findings positive for OM of distal phalanx, no drainable fluid collection.    - Bacterial and fungal cultures sent today from fluid expressed from nail bed - d/w NP.     - f/u blood cultures.    - Check ESR/CRP    - Pt will likely require PICC line and long term abx, pending further decision by patient regarding amputation.   She would like second opinion.     - Cont local wound care.     d/w pt and son at bedside.       Will follow,    Hannah Arias  [phone]

## 2019-11-27 NOTE — CHART NOTE - NSCHARTNOTEFT_GEN_A_CORE
Patient noted to have FS 64. Asymptomatic. Hypoglycemic protocol initiated and patient received total of apple juice x3, crackers, and D5 gel. With most recent repeat FS noted to be 67. BMP sent glucose 75. As per patient she does not take Humalog 15U TID premeal at home. She checks her sugars and takes insulin based on FS. She is ordered for both ISS and Humalog TID. Will need to confirm Insulin regimen in AM. Patient currently without complaints however because patient's FS remain low and she received increased insulin will order IV D5 @50cc x 4 hours. Will f/u repeat FS in AM. Will continue to monitor.     CAPILLARY BLOOD GLUCOSE      POCT Blood Glucose.: 67 mg/dL (26 Nov 2019 23:20)  POCT Blood Glucose.: 62 mg/dL (26 Nov 2019 23:17)  POCT Blood Glucose.: 75 mg/dL (26 Nov 2019 22:44)  POCT Blood Glucose.: 78 mg/dL (26 Nov 2019 22:43)  POCT Blood Glucose.: 65 mg/dL (26 Nov 2019 22:24)  POCT Blood Glucose.: 65 mg/dL (26 Nov 2019 22:09)  POCT Blood Glucose.: 64 mg/dL (26 Nov 2019 21:29)  POCT Blood Glucose.: 221 mg/dL (26 Nov 2019 18:43)  POCT Blood Glucose.: 195 mg/dL (26 Nov 2019 17:18)  POCT Blood Glucose.: 156 mg/dL (26 Nov 2019 10:41)

## 2019-11-27 NOTE — PROGRESS NOTE ADULT - SUBJECTIVE AND OBJECTIVE BOX
Patient is a 74y old  Female who presents with a chief complaint of osteomyelitis of the finger (26 Nov 2019 21:36)      INTERVAL HPI/OVERNIGHT EVENTS:  T(C): 36.7 (11-27-19 @ 13:47), Max: 36.9 (11-26-19 @ 15:50)  HR: 68 (11-27-19 @ 13:47) (64 - 82)  BP: 122/64 (11-27-19 @ 13:47) (122/64 - 150/83)  RR: 18 (11-27-19 @ 13:47) (18 - 183)  SpO2: 100% (11-27-19 @ 13:47) (100% - 100%)  Wt(kg): --  I&O's Summary    26 Nov 2019 07:01  -  27 Nov 2019 07:00  --------------------------------------------------------  IN: 1650 mL / OUT: 0 mL / NET: 1650 mL        LABS:                        9.9    6.18  )-----------( 209      ( 27 Nov 2019 06:00 )             31.2     11-27    141  |  104  |  13  ----------------------------<  129<H>  3.8   |  26  |  0.73    Ca    9.3      27 Nov 2019 06:00    TPro  6.7  /  Alb  3.7  /  TBili  0.3  /  DBili  x   /  AST  16  /  ALT  8   /  AlkPhos  45  11-27        CAPILLARY BLOOD GLUCOSE      POCT Blood Glucose.: 185 mg/dL (27 Nov 2019 13:35)  POCT Blood Glucose.: 151 mg/dL (27 Nov 2019 08:11)  POCT Blood Glucose.: 125 mg/dL (27 Nov 2019 06:05)  POCT Blood Glucose.: 67 mg/dL (26 Nov 2019 23:20)  POCT Blood Glucose.: 62 mg/dL (26 Nov 2019 23:17)  POCT Blood Glucose.: 75 mg/dL (26 Nov 2019 22:44)  POCT Blood Glucose.: 78 mg/dL (26 Nov 2019 22:43)  POCT Blood Glucose.: 65 mg/dL (26 Nov 2019 22:24)  POCT Blood Glucose.: 65 mg/dL (26 Nov 2019 22:09)  POCT Blood Glucose.: 64 mg/dL (26 Nov 2019 21:29)  POCT Blood Glucose.: 221 mg/dL (26 Nov 2019 18:43)  POCT Blood Glucose.: 195 mg/dL (26 Nov 2019 17:18)            MEDICATIONS  (STANDING):  aspirin enteric coated 81 milliGRAM(s) Oral daily  atorvastatin 20 milliGRAM(s) Oral at bedtime  BACItracin   Ointment 1 Application(s) Topical three times a day  dextrose 5%. 1000 milliLiter(s) (50 mL/Hr) IV Continuous <Continuous>  dextrose 5%. 1000 milliLiter(s) (50 mL/Hr) IV Continuous <Continuous>  dextrose 5%. 1000 milliLiter(s) (50 mL/Hr) IV Continuous <Continuous>  dextrose 50% Injectable 12.5 Gram(s) IV Push once  dextrose 50% Injectable 25 Gram(s) IV Push once  dextrose 50% Injectable 25 Gram(s) IV Push once  enoxaparin Injectable 40 milliGRAM(s) SubCutaneous daily  insulin lispro (HumaLOG) corrective regimen sliding scale   SubCutaneous three times a day before meals  insulin lispro (HumaLOG) corrective regimen sliding scale   SubCutaneous at bedtime  piperacillin/tazobactam IVPB.. 3.375 Gram(s) IV Intermittent every 8 hours  vancomycin  IVPB 1000 milliGRAM(s) IV Intermittent every 12 hours    MEDICATIONS  (PRN):  dextrose 40% Gel 15 Gram(s) Oral once PRN Blood Glucose LESS THAN 70 milliGRAM(s)/deciliter  glucagon  Injectable 1 milliGRAM(s) IntraMuscular once PRN Glucose LESS THAN 70 milligrams/deciliter          PHYSICAL EXAM:  GENERAL: NAD, well-groomed, well-developed  HEAD:  Atraumatic, Normocephalic  CHEST/LUNG: Clear to percussion bilaterally; No rales, rhonchi, wheezing, or rubs  HEART: Regular rate and rhythm; No murmurs, rubs, or gallops  ABDOMEN: Soft, Nontender, Nondistended; Bowel sounds present  EXTREMITIES:  2+ Peripheral Pulses, No clubbing, cyanosis, or edema  LYMPH: No lymphadenopathy noted  SKIN: No rashes or lesions    Care Discussed with Consultants/Other Providers [ ] YES  [ ] NO

## 2019-11-27 NOTE — PROGRESS NOTE ADULT - SUBJECTIVE AND OBJECTIVE BOX
Infectious Diseases progress note:    Subjective:  NAD, afebrile.  WBC normal.  Denies f/c/r.  Son at bedside.      ROS:  CONSTITUTIONAL:  No fever, chills, rigors  CARDIOVASCULAR:  No chest pain or palpitations  RESPIRATORY:   No SOB, cough, dyspnea on exertion.  No wheezing  GASTROINTESTINAL:  No abd pain, N/V, diarrhea/constipation  EXTREMITIES:  No swelling or joint pain  GENITOURINARY:  No burning on urination, increased frequency or urgency.  No flank pain  NEUROLOGIC:  No HA, visual disturbances  SKIN: No rashes    Allergies    No Known Allergies    Intolerances        ANTIBIOTICS/RELEVANT:  antimicrobials  piperacillin/tazobactam IVPB.. 3.375 Gram(s) IV Intermittent every 8 hours  vancomycin  IVPB 1000 milliGRAM(s) IV Intermittent every 12 hours    immunologic:    OTHER:  aspirin enteric coated 81 milliGRAM(s) Oral daily  atorvastatin 20 milliGRAM(s) Oral at bedtime  BACItracin   Ointment 1 Application(s) Topical three times a day  dextrose 40% Gel 15 Gram(s) Oral once PRN  dextrose 5%. 1000 milliLiter(s) IV Continuous <Continuous>  dextrose 5%. 1000 milliLiter(s) IV Continuous <Continuous>  dextrose 5%. 1000 milliLiter(s) IV Continuous <Continuous>  dextrose 50% Injectable 12.5 Gram(s) IV Push once  dextrose 50% Injectable 25 Gram(s) IV Push once  dextrose 50% Injectable 25 Gram(s) IV Push once  enoxaparin Injectable 40 milliGRAM(s) SubCutaneous daily  glucagon  Injectable 1 milliGRAM(s) IntraMuscular once PRN  insulin glargine Injectable (LANTUS) 10 Unit(s) SubCutaneous at bedtime  insulin lispro (HumaLOG) corrective regimen sliding scale   SubCutaneous three times a day before meals  insulin lispro (HumaLOG) corrective regimen sliding scale   SubCutaneous at bedtime      Objective:  Vital Signs Last 24 Hrs  T(C): 36.9 (27 Nov 2019 17:27), Max: 36.9 (27 Nov 2019 17:27)  T(F): 98.5 (27 Nov 2019 17:27), Max: 98.5 (27 Nov 2019 17:27)  HR: 72 (27 Nov 2019 17:27) (64 - 72)  BP: 145/72 (27 Nov 2019 17:27) (122/64 - 145/72)  BP(mean): --  RR: 18 (27 Nov 2019 17:27) (18 - 18)  SpO2: 99% (27 Nov 2019 17:27) (99% - 100%)    PHYSICAL EXAM:  Constitutional:NAD  Eyes:ADDISON, EOMI  Ear/Nose/Throat: no thrush, mucositis.  Moist mucous membranes	  Neck:no JVD, no lymphadenopathy, supple  Respiratory: CTA raad  Cardiovascular: S1S2 RRR, no murmurs  Gastrointestinal:soft, nontender,  nondistended (+) BS  Extremities:no e/e/c  Skin: rt hand 2nd digit dsg intact        LABS:                        9.9    6.18  )-----------( 209      ( 27 Nov 2019 06:00 )             31.2     11-27    141  |  104  |  13  ----------------------------<  129<H>  3.8   |  26  |  0.73    Ca    9.3      27 Nov 2019 06:00    TPro  6.7  /  Alb  3.7  /  TBili  0.3  /  DBili  x   /  AST  16  /  ALT  8   /  AlkPhos  45  11-27        MICROBIOLOGY:    Culture - Blood (11.26.19 @ 16:30)    Culture - Blood:   NO ORGANISMS ISOLATED  NO ORGANISMS ISOLATED AT 24 HOURS    Specimen Source: Peripheral Site 2    Culture - Blood (11.26.19 @ 16:30)    Culture - Blood:   NO ORGANISMS ISOLATED  NO ORGANISMS ISOLATED AT 24 HOURS    Specimen Source: Peripheral Site 1          RADIOLOGY & ADDITIONAL STUDIES:    < from: MR Hand w/ IV Cont, Right (11.27.19 @ 12:58) >  IMPRESSION:    Soft tissue swelling with edema and enhancement in the second digit   nailbed suspicious for soft tissue infection. No drainable fluid   collection.  Osteomyelitis of the second distal phalanx. Suspected pathologic fracture   at the distal tuft.  Nonspecific tenosynovitis of the fourth flexor tendon.    < end of copied text >

## 2019-11-27 NOTE — PROVIDER CONTACT NOTE (OTHER) - ASSESSMENT
Pt resting, denies any S/S of distress. Believes too much insulin was given during day as she medicated herself on sliding scale.
Pt asymptomatic, denies any N/V, shakes, sweating etc. Pt stated that she knows her hypoglycemic S/S & she is not feeling any. After continuing hypoglycemic PO protocol, & BG not increasing, PA ordered BMP. BMP drawn & BG 75. Pt stated that she does not want to eat/drink anymore. PA ordered to hold dextrose drip & insulin until after BMP results.

## 2019-11-27 NOTE — PROVIDER CONTACT NOTE (OTHER) - BACKGROUND
Pt here for infected finger, manages DM at home. Pt received 17U of insulin today which she stated to never take that much at home

## 2019-11-27 NOTE — PROVIDER CONTACT NOTE (OTHER) - ACTION/TREATMENT ORDERED:
PA made aware, requested pts pharmacy info to confirm home insulin regimen. No further orders given. Morning BMP to be drawn 0500 for new BG results.

## 2019-11-27 NOTE — PROVIDER CONTACT NOTE (OTHER) - RECOMMENDATIONS
Telephone Encounter by Salena Hays NCMA at 08/04/17 09:29 AM     Author:  Salena Hays NCMA Service:  (none) Author Type:  Certified Medical Assistant     Filed:  08/04/17 09:29 AM Encounter Date:  8/4/2017 Status:  Signed     :  Salena Hays NCMA (Certified Medical Assistant)       From: Rajendra Bajwa  To: Dreyer, Roman, MD  Sent: 8/4/2017  8:42 AM CDT  Subject: Other    Dr. Dreyer,    I just wanted to know something that I did not mention at my visit.  When   my brother, Thompson, passed away in April, I did indulge in more sweet foods   and alcohol.  It did not surprise me that my A1C was up.    But good news, yesterday morning my sugar was 113 and this morning (8/4)   it was 100!!!!  I did increase the Lantus to 25 units for the past 3 days and next week I   will go to 30 units as I was instructed when I got my lab results.    My sugar has not been 100 for a long time.  I have been happy with any   reading beginning with a 1.    Rajendra       Revision History        Date/Time User Provider Type Action    > 08/04/17 09:29 AM Salena Hays NCMA Certified Medical Assistant Sign    Attribution information within the note text is not available.            
Continue to monitor.
Continue to monitor, recheck in the morning.

## 2019-11-27 NOTE — CONSULT NOTE ADULT - PROBLEM SELECTOR RECOMMENDATION 9
- Hba1c 7.7%, improved from before as per patient   - Noted to have episode of hypoglycemia last night in setting of giving higher doses of Insulin than her weight based and home regimen. Patient received total of 17 units of Humalog before dinner causing her FS to drop to 60s  - Recommend to start weight base doses which is also similar to home regimen: Lantus 10 units sq qhs  - Hold off on pre meals given hypoglycemia episode as she is very sensitive to insulin  - Continue low dose Humalog correctional scale before meals and low bedtime scale  - Monitor FS before meals and at bedtime  - FS goal 100-180  Discharge plan: Patient can go home on her home regimen: Lantus 10-12 units sq qhs along with Janumet 50/1000 mg bid  If patient wishes to follow up with [facility]   [facility]  [address] [place] [address] [address] [address]  [phone]   Please call to schedule appointment with CDE/Nutritionist and MD appointment next available.

## 2019-11-27 NOTE — CHART NOTE - NSCHARTNOTEFT_GEN_A_CORE
Called Dr. Juana Evans office [phone] for a consult. As per Bishop from scheduling department and Odilia from the coordinating department Dr. Juana Evans does not come to [facility] for inpt consults  d/w medical attending

## 2019-11-27 NOTE — PROGRESS NOTE ADULT - ASSESSMENT
73yo F h/o DM pw infected right second digit. ot started having swelling and redness to distal phalanx about 1 month ago. was placed on a course of cefuroxime for 7 days and had incision and drainage of ?paronychia. no improvement of infection, was then placed on course of doxycycline, again with no improvement. pt had another I and d yesterday with drainage of blood and pus and started on keflex, also had xr yesterday. received called today with xray report saying extensive OM and pt was told by pmd Dr Arvizu to go to ED. denies fevers, chills or other systemic symptoms    Problem/Plan - 1:  ·  Problem: Osteomyelitis.  Plan: plastics surgery consult appreciated  ID fu  antibiotics as per ID.   check mRI of the hand     Problem/Plan - 2:  ·  Problem: Diabetes.  Plan: monitor FS  ISS.   endocrine consult    case dw pt, PA and pts PCP at length

## 2019-11-27 NOTE — CONSULT NOTE ADULT - SUBJECTIVE AND OBJECTIVE BOX
HPI:  73yo F h/o DM pw infected right second digit. ot started having swelling and redness to distal phalanx about 1 month ago. was placed on a course of cefuroxime for 7 days and had incision and drainage of ?paronychia. no improvement of infection, was then placed on course of doxycycline, again with no improvement. pt had another I and d yesterday with drainage of blood and pus and started on keflex, also had xr yesterday. received called today with xray report saying extensive OM and pt was told by pmd Dr Arvizu to go to ED. denies fevers, chills or other systemic symptoms (26 Nov 2019 17:14)      Endocrine history:  Diagnosed with T2DM more than 20 years ago on routine blood work, was initially just on oral agents glipizide and later metformin was added. Follows with PCP Dr. Diego Arvizu. Started insulin over a year ago as her HbA1c was increased to 13%, last PCP visit 2 months ago and A1c was 8%. Currently takes Lantus 10-12 units sq qhs (only if FS >150) and Janumet 50/1000 mg bid. Was on Humalog in past year, but was asked to stop 3-4 months ago as her FS was doing better and she was getting hypoglycemic episodes if she takes more than 3 units of Humalog. Checks FS 2 times a day, AM ranges <200 and PM 150s-260s. No hypoglycemia reported recently. Made lot of dietary changes over the years and is active. Up to date with opthalmology follow up, no retinopathy. No neuropathy, does not see podiatrist. No known nephropathy. No known macrovascular complications either.   Family history of DM in father and sisters. No hospital admissions in past for hyperglycemia or hypoglycemia.     On admission, . Was started on Lantus 25 units sq qhs (did not receive) and Humalog 15 units sq ac TID with low dose Humalog correctional scale before meals and bedtime. After receiving total of 17 units before dinner yesterday, she dropped overnight to 60s and was persistently hypoglycemic with symptoms of shaky and lightheaded. Received total of apple juice x3, crackers, and D5 gel. Repeat FS improved after a while to 70s. AM  and now have been ranging 180s, 220s. Currently reports feeling fine.         PAST MEDICAL & SURGICAL HISTORY:  Diabetes  No significant past surgical history      FAMILY HISTORY:  Family history of DM in father and siblings      Social History:  Retired RN.   No smoking, alcohol or illicit drug use.     Outpatient Medications:  Lantus 10-12 units sq qhs  Janumet 50/1000 mg bid  Atorvastatin 20 mg qhs    MEDICATIONS  (STANDING):  aspirin enteric coated 81 milliGRAM(s) Oral daily  atorvastatin 20 milliGRAM(s) Oral at bedtime  BACItracin   Ointment 1 Application(s) Topical three times a day  dextrose 5%. 1000 milliLiter(s) (50 mL/Hr) IV Continuous <Continuous>  dextrose 5%. 1000 milliLiter(s) (50 mL/Hr) IV Continuous <Continuous>  dextrose 5%. 1000 milliLiter(s) (50 mL/Hr) IV Continuous <Continuous>  dextrose 50% Injectable 12.5 Gram(s) IV Push once  dextrose 50% Injectable 25 Gram(s) IV Push once  dextrose 50% Injectable 25 Gram(s) IV Push once  enoxaparin Injectable 40 milliGRAM(s) SubCutaneous daily  insulin glargine Injectable (LANTUS) 10 Unit(s) SubCutaneous at bedtime  insulin lispro (HumaLOG) corrective regimen sliding scale   SubCutaneous three times a day before meals  insulin lispro (HumaLOG) corrective regimen sliding scale   SubCutaneous at bedtime  piperacillin/tazobactam IVPB.. 3.375 Gram(s) IV Intermittent every 8 hours  vancomycin  IVPB 1000 milliGRAM(s) IV Intermittent every 12 hours    MEDICATIONS  (PRN):  dextrose 40% Gel 15 Gram(s) Oral once PRN Blood Glucose LESS THAN 70 milliGRAM(s)/deciliter  glucagon  Injectable 1 milliGRAM(s) IntraMuscular once PRN Glucose LESS THAN 70 milligrams/deciliter      Allergies  No Known Allergies        Review of Systems:  Constitutional: No fever, + fair appetite/po intake, + wt loss over past few months   Eyes: No blurry vision, diplopia  Neuro: No tremors  HEENT: No pain  Cardiovascular: No chest pain, palpitations  Respiratory: No SOB, no cough  GI: No nausea, vomiting,   : No dysuria, hematuria  Skin: + right second digit ulceration   Psych: no depression  Endocrine: no polyuria, polydipsia  Hem/lymph: no swelling  Osteoporosis: no fractures    ALL OTHER SYSTEMS REVIEWED AND NEGATIVE      PHYSICAL EXAM:  VITALS: T(C): 36.9 (11-27-19 @ 17:27)  T(F): 98.5 (11-27-19 @ 17:27), Max: 98.5 (11-27-19 @ 17:27)  HR: 72 (11-27-19 @ 17:27) (64 - 76)  BP: 145/72 (11-27-19 @ 17:27) (122/64 - 145/72)  RR:  (18 - 18)  SpO2:  (99% - 100%)  Wt(kg): --  GENERAL: NAD, well-groomed, well-developed  EYES: anicteric  HEENT:  Atraumatic, Normocephalic, moist mucous membranes  THYROID: Normal size, no palpable nodules  RESPIRATORY: Clear to auscultation bilaterally; No rales, rhonchi, wheezing, or rubs  CARDIOVASCULAR: Regular rate and rhythm; No murmurs; no peripheral edema  GI: Soft, nontender, non distended, normal bowel sounds  SKIN: + right second digit ulcer dressed with regular dressing   NEURO: AAO x 3, no gross or focal deficit   PSYCH: reactive affect, euthymic mood  CUSHING'S SIGNS: no striae    POCT Blood Glucose.: 220 mg/dL (11-27-19 @ 17:33)  POCT Blood Glucose.: 185 mg/dL (11-27-19 @ 13:35)  POCT Blood Glucose.: 151 mg/dL (11-27-19 @ 08:11)  POCT Blood Glucose.: 125 mg/dL (11-27-19 @ 06:05)  POCT Blood Glucose.: 67 mg/dL (11-26-19 @ 23:20)  POCT Blood Glucose.: 62 mg/dL (11-26-19 @ 23:17)  POCT Blood Glucose.: 75 mg/dL (11-26-19 @ 22:44)  POCT Blood Glucose.: 78 mg/dL (11-26-19 @ 22:43)  POCT Blood Glucose.: 65 mg/dL (11-26-19 @ 22:24)  POCT Blood Glucose.: 65 mg/dL (11-26-19 @ 22:09)  POCT Blood Glucose.: 64 mg/dL (11-26-19 @ 21:29)  POCT Blood Glucose.: 221 mg/dL (11-26-19 @ 18:43)  POCT Blood Glucose.: 195 mg/dL (11-26-19 @ 17:18)  POCT Blood Glucose.: 156 mg/dL (11-26-19 @ 10:41)                            9.9    6.18  )-----------( 209      ( 27 Nov 2019 06:00 )             31.2       11-27    141  |  104  |  13  ----------------------------<  129<H>  3.8   |  26  |  0.73    EGFR if : 95  EGFR if non : 82    Ca    9.3      11-27    TPro  6.7  /  Alb  3.7  /  TBili  0.3  /  DBili  x   /  AST  16  /  ALT  8   /  AlkPhos  45  11-27      Hemoglobin A1C, Whole Blood: 7.7 % <H> [4.0 - 5.6] (11-26-19 @ 23:50)      11-27 Chol 162 LDL 84 HDL 74<H> Trig 69

## 2019-11-28 LAB
ANION GAP SERPL CALC-SCNC: 12 MMO/L — SIGNIFICANT CHANGE UP (ref 7–14)
BUN SERPL-MCNC: 11 MG/DL — SIGNIFICANT CHANGE UP (ref 7–23)
CALCIUM SERPL-MCNC: 9.3 MG/DL — SIGNIFICANT CHANGE UP (ref 8.4–10.5)
CHLORIDE SERPL-SCNC: 104 MMOL/L — SIGNIFICANT CHANGE UP (ref 98–107)
CO2 SERPL-SCNC: 24 MMOL/L — SIGNIFICANT CHANGE UP (ref 22–31)
CREAT SERPL-MCNC: 0.7 MG/DL — SIGNIFICANT CHANGE UP (ref 0.5–1.3)
GLUCOSE BLDC GLUCOMTR-MCNC: 107 MG/DL — HIGH (ref 70–99)
GLUCOSE BLDC GLUCOMTR-MCNC: 203 MG/DL — HIGH (ref 70–99)
GLUCOSE BLDC GLUCOMTR-MCNC: 264 MG/DL — HIGH (ref 70–99)
GLUCOSE SERPL-MCNC: 98 MG/DL — SIGNIFICANT CHANGE UP (ref 70–99)
HCT VFR BLD CALC: 30.4 % — LOW (ref 34.5–45)
HGB BLD-MCNC: 9.8 G/DL — LOW (ref 11.5–15.5)
MAGNESIUM SERPL-MCNC: 1.5 MG/DL — LOW (ref 1.6–2.6)
MCHC RBC-ENTMCNC: 29.7 PG — SIGNIFICANT CHANGE UP (ref 27–34)
MCHC RBC-ENTMCNC: 32.2 % — SIGNIFICANT CHANGE UP (ref 32–36)
MCV RBC AUTO: 92.1 FL — SIGNIFICANT CHANGE UP (ref 80–100)
NRBC # FLD: 0 K/UL — SIGNIFICANT CHANGE UP (ref 0–0)
PHOSPHATE SERPL-MCNC: 3.8 MG/DL — SIGNIFICANT CHANGE UP (ref 2.5–4.5)
PLATELET # BLD AUTO: 203 K/UL — SIGNIFICANT CHANGE UP (ref 150–400)
PMV BLD: 9.7 FL — SIGNIFICANT CHANGE UP (ref 7–13)
POTASSIUM SERPL-MCNC: 3.7 MMOL/L — SIGNIFICANT CHANGE UP (ref 3.5–5.3)
POTASSIUM SERPL-SCNC: 3.7 MMOL/L — SIGNIFICANT CHANGE UP (ref 3.5–5.3)
RBC # BLD: 3.3 M/UL — LOW (ref 3.8–5.2)
RBC # FLD: 12.7 % — SIGNIFICANT CHANGE UP (ref 10.3–14.5)
SODIUM SERPL-SCNC: 140 MMOL/L — SIGNIFICANT CHANGE UP (ref 135–145)
SPECIMEN SOURCE: SIGNIFICANT CHANGE UP
WBC # BLD: 5.79 K/UL — SIGNIFICANT CHANGE UP (ref 3.8–10.5)
WBC # FLD AUTO: 5.79 K/UL — SIGNIFICANT CHANGE UP (ref 3.8–10.5)

## 2019-11-28 RX ORDER — PIPERACILLIN AND TAZOBACTAM 4; .5 G/20ML; G/20ML
3.38 INJECTION, POWDER, LYOPHILIZED, FOR SOLUTION INTRAVENOUS EVERY 8 HOURS
Refills: 0 | Status: DISCONTINUED | OUTPATIENT
Start: 2019-11-28 | End: 2019-11-29

## 2019-11-28 RX ORDER — VANCOMYCIN HCL 1 G
1000 VIAL (EA) INTRAVENOUS EVERY 12 HOURS
Refills: 0 | Status: DISCONTINUED | OUTPATIENT
Start: 2019-11-28 | End: 2019-11-29

## 2019-11-28 RX ADMIN — Medication 81 MILLIGRAM(S): at 12:37

## 2019-11-28 RX ADMIN — Medication 250 MILLIGRAM(S): at 12:38

## 2019-11-28 RX ADMIN — Medication 1 APPLICATION(S): at 21:40

## 2019-11-28 RX ADMIN — ATORVASTATIN CALCIUM 20 MILLIGRAM(S): 80 TABLET, FILM COATED ORAL at 21:40

## 2019-11-28 RX ADMIN — Medication 250 MILLIGRAM(S): at 01:03

## 2019-11-28 RX ADMIN — Medication 1: at 22:31

## 2019-11-28 RX ADMIN — Medication 3: at 12:37

## 2019-11-28 RX ADMIN — PIPERACILLIN AND TAZOBACTAM 25 GRAM(S): 4; .5 INJECTION, POWDER, LYOPHILIZED, FOR SOLUTION INTRAVENOUS at 21:40

## 2019-11-28 RX ADMIN — Medication 1 APPLICATION(S): at 14:55

## 2019-11-28 RX ADMIN — PIPERACILLIN AND TAZOBACTAM 25 GRAM(S): 4; .5 INJECTION, POWDER, LYOPHILIZED, FOR SOLUTION INTRAVENOUS at 05:19

## 2019-11-28 RX ADMIN — Medication 2: at 17:22

## 2019-11-28 RX ADMIN — Medication 1 APPLICATION(S): at 05:19

## 2019-11-28 RX ADMIN — INSULIN GLARGINE 10 UNIT(S): 100 INJECTION, SOLUTION SUBCUTANEOUS at 22:29

## 2019-11-28 RX ADMIN — PIPERACILLIN AND TAZOBACTAM 25 GRAM(S): 4; .5 INJECTION, POWDER, LYOPHILIZED, FOR SOLUTION INTRAVENOUS at 13:45

## 2019-11-28 RX ADMIN — ENOXAPARIN SODIUM 40 MILLIGRAM(S): 100 INJECTION SUBCUTANEOUS at 21:39

## 2019-11-28 NOTE — PROGRESS NOTE ADULT - SUBJECTIVE AND OBJECTIVE BOX
Infectious Diseases progress note:    Subjective:  PT afebrile, normal WBC.  Wound cx (+) staph aureus.     ROS:  CONSTITUTIONAL:  No fever, chills, rigors  CARDIOVASCULAR:  No chest pain or palpitations  RESPIRATORY:   No SOB, cough, dyspnea on exertion.  No wheezing  GASTROINTESTINAL:  No abd pain, N/V, diarrhea/constipation  EXTREMITIES:  No swelling or joint pain  GENITOURINARY:  No burning on urination, increased frequency or urgency.  No flank pain  NEUROLOGIC:  No HA, visual disturbances  SKIN: No rashes    Allergies    No Known Allergies    Intolerances        ANTIBIOTICS/RELEVANT:  antimicrobials  piperacillin/tazobactam IVPB.. 3.375 Gram(s) IV Intermittent every 8 hours  vancomycin  IVPB 1000 milliGRAM(s) IV Intermittent every 12 hours    immunologic:    OTHER:  aspirin enteric coated 81 milliGRAM(s) Oral daily  atorvastatin 20 milliGRAM(s) Oral at bedtime  BACItracin   Ointment 1 Application(s) Topical three times a day  dextrose 40% Gel 15 Gram(s) Oral once PRN  dextrose 5%. 1000 milliLiter(s) IV Continuous <Continuous>  dextrose 5%. 1000 milliLiter(s) IV Continuous <Continuous>  dextrose 5%. 1000 milliLiter(s) IV Continuous <Continuous>  dextrose 50% Injectable 12.5 Gram(s) IV Push once  dextrose 50% Injectable 25 Gram(s) IV Push once  dextrose 50% Injectable 25 Gram(s) IV Push once  enoxaparin Injectable 40 milliGRAM(s) SubCutaneous daily  glucagon  Injectable 1 milliGRAM(s) IntraMuscular once PRN  insulin glargine Injectable (LANTUS) 10 Unit(s) SubCutaneous at bedtime  insulin lispro (HumaLOG) corrective regimen sliding scale   SubCutaneous three times a day before meals  insulin lispro (HumaLOG) corrective regimen sliding scale   SubCutaneous at bedtime      Objective:  Vital Signs Last 24 Hrs  T(C): 36.5 (28 Nov 2019 05:13), Max: 36.9 (27 Nov 2019 17:27)  T(F): 97.7 (28 Nov 2019 05:13), Max: 98.5 (27 Nov 2019 17:27)  HR: 63 (28 Nov 2019 05:13) (63 - 72)  BP: 104/62 (28 Nov 2019 05:13) (104/62 - 145/72)  BP(mean): --  RR: 18 (28 Nov 2019 05:13) (18 - 19)  SpO2: 100% (28 Nov 2019 05:13) (99% - 100%)    PHYSICAL EXAM:  Constitutional:NAD  Eyes:ADDISON, EOMI  Ear/Nose/Throat: no thrush, mucositis.  Moist mucous membranes	  Neck:no JVD, no lymphadenopathy, supple  Respiratory: CTA raad  Cardiovascular: S1S2 RRR, no murmurs  Gastrointestinal:soft, nontender,  nondistended (+) BS  Extremities:no e/e/c. Rt hand 2nd digit with swelling at distal tip, small amt of pus/dried blood underneath nail bed.    Skin:  no rashes, open wounds or ulcerations        LABS:                        9.8    5.79  )-----------( 203      ( 28 Nov 2019 05:40 )             30.4     11-28    140  |  104  |  11  ----------------------------<  98  3.7   |  24  |  0.70    Ca    9.3      28 Nov 2019 05:40  Phos  3.8     11-28  Mg     1.5     11-28    TPro  6.7  /  Alb  3.7  /  TBili  0.3  /  DBili  x   /  AST  16  /  ALT  8   /  AlkPhos  45  11-27                Vancomycin Level, Trough: 13.6 ug/mL (11-27 @ 22:50)              MICROBIOLOGY:      Culture - Wound (11.27.19 @ 11:43)    Culture - Wound:   MODRATE  STAU^Staphylococcus aureus    Specimen Source: OTHER    Culture - Blood (11.26.19 @ 16:30)    Culture - Blood:   NO ORGANISMS ISOLATED  NO ORGANISMS ISOLATED AT 24 HOURS    Specimen Source: Peripheral Site 2    Culture - Blood (11.26.19 @ 16:30)    Culture - Blood:   NO ORGANISMS ISOLATED  NO ORGANISMS ISOLATED AT 24 HOURS    Specimen Source: Peripheral Site 1        RADIOLOGY & ADDITIONAL STUDIES:    < from: MR Hand w/ IV Cont, Right (11.27.19 @ 12:58) >    IMPRESSION:    Soft tissue swelling with edema and enhancement in the second digit   nailbed suspicious for soft tissue infection. No drainable fluid   collection.  Osteomyelitis of the second distal phalanx. Suspected pathologic fracture   at the distal tuft.  Nonspecific tenosynovitis of the fourth flexor tendon.    < end of copied text >

## 2019-11-28 NOTE — PROGRESS NOTE ADULT - SUBJECTIVE AND OBJECTIVE BOX
Attending: Manoj Prasad  [address] [facility] [address] [phone]    Patient:HAROON PEPE  [id number]      Subjective:  Patient is a 74y old  Female who presents with a chief complaint of osteomyelitis of the finger (27 Nov 2019 18:33) being seen in follow up after MRI and ID eval. Patient does not want amputation at this point       Objective:  T(C): 36.5 (11-28-19 @ 05:13), Max: 36.9 (11-27-19 @ 17:27)  HR: 63 (11-28-19 @ 05:13) (63 - 72)  BP: 104/62 (11-28-19 @ 05:13) (104/62 - 145/72)  RR: 18 (11-28-19 @ 05:13) (18 - 19)  SpO2: 100% (11-28-19 @ 05:13) (99% - 100%)  Wt(kg): --   11-28    140  |  104  |  11  ----------------------------<  98  3.7   |  24  |  0.70    Ca    9.3      28 Nov 2019 05:40  Phos  3.8     11-28  Mg     1.5     11-28    TPro  6.7  /  Alb  3.7  /  TBili  0.3  /  DBili  x   /  AST  16  /  ALT  8   /  AlkPhos  45  11-27                        9.8    5.79  )-----------( 203      ( 28 Nov 2019 05:40 )             30.4       11-27 @ 07:01  -  11-28 @ 07:00  --------------------------------------------------------  IN: 450 mL / OUT: 0 mL / NET: 450 mL      PHYSICAL EXAM:    General:  NAD  AOx3  Breathing unlabored  IF in dressings no increase in swelling or pain      MEDICATIONS  (STANDING):  aspirin enteric coated 81 milliGRAM(s) Oral daily  atorvastatin 20 milliGRAM(s) Oral at bedtime  BACItracin   Ointment 1 Application(s) Topical three times a day  enoxaparin Injectable 40 milliGRAM(s) SubCutaneous daily  insulin glargine Injectable (LANTUS) 10 Unit(s) SubCutaneous at bedtime  insulin lispro (HumaLOG) corrective regimen sliding scale   SubCutaneous three times a day before meals  insulin lispro (HumaLOG) corrective regimen sliding scale   SubCutaneous at bedtime  piperacillin/tazobactam IVPB.. 3.375 Gram(s) IV Intermittent every 8 hours  vancomycin  IVPB 1000 milliGRAM(s) IV Intermittent every 12 hours    MRI reviewed      Assessment/Plan:  Patient is a 74y old  Female who presents with a chief complaint of osteomyelitis of the finger (27 Nov 2019 18:33)  - MRI consistent with XRAYS.   I have had a lengthy discussion with family that in my opinion that most definitivbe treatment would be an amputation through the DIP joint and sending a portion of the middle phalanx for biopsy. If biopsy negative may eliminate need for long term antibiotics.  I have explained that this is not an emergent condition and they are more than welcome to seek a second consultation as there is no systemic sign of infection and the would is draining. However she needs to understand risks of going home w picc and IV Abx.    For now will sign off unless the family changes their mind  I have told them I will be away in December she may need to find someone else for outpatient follow up

## 2019-11-28 NOTE — PROGRESS NOTE ADULT - ASSESSMENT
73yo F h/o DM pw infected right second digit. ot started having swelling and redness to distal phalanx about 1 month ago. was placed on a course of cefuroxime for 7 days and had incision and drainage of ?paronychia. no improvement of infection, was then placed on course of doxycycline, again with no improvement. pt had another I and d yesterday with drainage of blood and pus and started on keflex, also had xr yesterday. received called today with xray report saying extensive OM and pt was told by pmd Dr Arvizu to go to ED. denies fevers, chills or other systemic symptoms    Problem/Plan - 1:  ·  Problem: Osteomyelitis.  Plan: plastics surgery consult appreciated  ID fu  antibiotics as per ID.   MRI of the hand reviewed    Problem/Plan - 2:  ·  Problem: Diabetes.  Plan: monitor FS  ISS.   endocrine consult    case dw pt, PA and pts PCP at length   case dw pts son at length

## 2019-11-28 NOTE — CHART NOTE - NSCHARTNOTEFT_GEN_A_CORE
Dr. Thong Caruso called as per family request [phone] d/w Dr. Mcintosh plastics does not come to [facility]  d/w Family, Dr. Arias, and Dr. Lee.    As per family they want CD for a 2nd opinion as an outpt   pt and family choosing PICC line with IV abx and outpt f/u for 2nd opinion

## 2019-11-28 NOTE — PROGRESS NOTE ADULT - SUBJECTIVE AND OBJECTIVE BOX
Patient is a 74y old  Female who presents with a chief complaint of osteomyelitis of the finger (28 Nov 2019 14:02)      INTERVAL HPI/OVERNIGHT EVENTS:  T(C): 36.6 (11-28-19 @ 14:33), Max: 36.9 (11-27-19 @ 17:27)  HR: 64 (11-28-19 @ 14:33) (63 - 72)  BP: 131/71 (11-28-19 @ 14:33) (104/62 - 145/72)  RR: 17 (11-28-19 @ 14:33) (17 - 19)  SpO2: 100% (11-28-19 @ 14:33) (99% - 100%)  Wt(kg): --  I&O's Summary    27 Nov 2019 07:01  -  28 Nov 2019 07:00  --------------------------------------------------------  IN: 450 mL / OUT: 0 mL / NET: 450 mL        LABS:                        9.8    5.79  )-----------( 203      ( 28 Nov 2019 05:40 )             30.4     11-28    140  |  104  |  11  ----------------------------<  98  3.7   |  24  |  0.70    Ca    9.3      28 Nov 2019 05:40  Phos  3.8     11-28  Mg     1.5     11-28    TPro  6.7  /  Alb  3.7  /  TBili  0.3  /  DBili  x   /  AST  16  /  ALT  8   /  AlkPhos  45  11-27        CAPILLARY BLOOD GLUCOSE  261 (28 Nov 2019 12:36)      POCT Blood Glucose.: 107 mg/dL (28 Nov 2019 07:59)  POCT Blood Glucose.: 290 mg/dL (27 Nov 2019 22:20)  POCT Blood Glucose.: 220 mg/dL (27 Nov 2019 17:33)            MEDICATIONS  (STANDING):  aspirin enteric coated 81 milliGRAM(s) Oral daily  atorvastatin 20 milliGRAM(s) Oral at bedtime  BACItracin   Ointment 1 Application(s) Topical three times a day  dextrose 5%. 1000 milliLiter(s) (50 mL/Hr) IV Continuous <Continuous>  dextrose 5%. 1000 milliLiter(s) (50 mL/Hr) IV Continuous <Continuous>  dextrose 5%. 1000 milliLiter(s) (50 mL/Hr) IV Continuous <Continuous>  dextrose 50% Injectable 12.5 Gram(s) IV Push once  dextrose 50% Injectable 25 Gram(s) IV Push once  dextrose 50% Injectable 25 Gram(s) IV Push once  enoxaparin Injectable 40 milliGRAM(s) SubCutaneous daily  insulin glargine Injectable (LANTUS) 10 Unit(s) SubCutaneous at bedtime  insulin lispro (HumaLOG) corrective regimen sliding scale   SubCutaneous three times a day before meals  insulin lispro (HumaLOG) corrective regimen sliding scale   SubCutaneous at bedtime  piperacillin/tazobactam IVPB.. 3.375 Gram(s) IV Intermittent every 8 hours  vancomycin  IVPB 1000 milliGRAM(s) IV Intermittent every 12 hours    MEDICATIONS  (PRN):  dextrose 40% Gel 15 Gram(s) Oral once PRN Blood Glucose LESS THAN 70 milliGRAM(s)/deciliter  glucagon  Injectable 1 milliGRAM(s) IntraMuscular once PRN Glucose LESS THAN 70 milligrams/deciliter          PHYSICAL EXAM:  GENERAL: NAD, well-groomed, well-developed  HEAD:  Atraumatic, Normocephalic  CHEST/LUNG: Clear to percussion bilaterally; No rales, rhonchi, wheezing, or rubs  HEART: Regular rate and rhythm; No murmurs, rubs, or gallops  ABDOMEN: Soft, Nontender, Nondistended; Bowel sounds present  EXTREMITIES:  2+ Peripheral Pulses, No clubbing, cyanosis, or edema  LYMPH: No lymphadenopathy noted  SKIN: No rashes or lesions    Care Discussed with Consultants/Other Providers [ ] YES  [ ] NO

## 2019-11-28 NOTE — PROGRESS NOTE ADULT - ASSESSMENT
75yo F h/o DM pw infected right second digit. Pt started having swelling and redness to distal phalanx about 1 month ago. was placed on a course of cefuroxime for 7 days (Oct 21) and had incision and drainage of ?paronychia. no improvement of infection, was then placed on course of doxycycline, again with no improvement. pt had another I and d yesterday with drainage of blood and pus and started on keflex, also had xr yesterday. received called today with xray report saying extensive OM and pt was told by pmd Dr Arvizu to go to ED. denies fevers, chills or other systemic symptoms (26 Nov 2019 17:14)    Pt seen by Plastics, wound was expressed with serosanguinous fluid.   Xray of digit s/o OM.  Pt started on vanco/zosyn.  ID consult called for further abx management.  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  She also does gardening and thinks she may have been pricked by a thorn.    Rt hand 2nd digit OM:    - Pt with chronic wound of 2nd digit, s/p multiple courses of abx without improvement.  Xray s/o OM.    - Recommend continuing vanco/zosyn.  f/u MRI of rt hand digit - findings positive for OM of distal phalanx, no drainable fluid collection.    - Bacterial and fungal cultures sent today from fluid expressed from nail bed - d/w NP.     - f/u blood cultures.    - Check ESR/CRP    - Pt wishes to hold off on amputation at this time, has had discussion with plastic surgery.  Will plan for PICC line and long term abx.   Still waiting on final wound culture results in order to appropriately narrow abx coverage.     - discussed risks of PICC line (infection, blood clot) and long term IV abx (cdiff, allergy, drug side effects).  Pt understands and wishes to continue with abx for now.      - Cont local wound care.     - f/u vanco trough    d/w pt and son at bedside.       Will follow,    Hannah Arias  [phone]

## 2019-11-29 ENCOUNTER — TRANSCRIPTION ENCOUNTER (OUTPATIENT)
Age: 74
End: 2019-11-29

## 2019-11-29 VITALS
TEMPERATURE: 98 F | OXYGEN SATURATION: 100 % | DIASTOLIC BLOOD PRESSURE: 63 MMHG | SYSTOLIC BLOOD PRESSURE: 126 MMHG | HEART RATE: 67 BPM | RESPIRATION RATE: 17 BRPM

## 2019-11-29 DIAGNOSIS — A49.01 METHICILLIN SUSCEPTIBLE STAPHYLOCOCCUS AUREUS INFECTION, UNSPECIFIED SITE: ICD-10-CM

## 2019-11-29 DIAGNOSIS — Z79.2 LONG TERM (CURRENT) USE OF ANTIBIOTICS: ICD-10-CM

## 2019-11-29 DIAGNOSIS — M86.9 OSTEOMYELITIS, UNSPECIFIED: ICD-10-CM

## 2019-11-29 DIAGNOSIS — Z51.81 ENCOUNTER FOR THERAPEUTIC DRUG LEVEL MONITORING: ICD-10-CM

## 2019-11-29 LAB
-  CEFAZOLIN: SIGNIFICANT CHANGE UP
-  CIPROFLOXACIN: SIGNIFICANT CHANGE UP
-  ERYTHROMYCIN: SIGNIFICANT CHANGE UP
-  GENTAMICIN: SIGNIFICANT CHANGE UP
-  LEVOFLOXACIN: SIGNIFICANT CHANGE UP
-  MOXIFLOXACIN(AEROBIC): SIGNIFICANT CHANGE UP
-  OXACILLIN: SIGNIFICANT CHANGE UP
-  PENICILLIN: SIGNIFICANT CHANGE UP
-  RIFAMPIN.: SIGNIFICANT CHANGE UP
-  TETRACYCLINE: SIGNIFICANT CHANGE UP
-  TRIMETHOPRIM/SULFAMETHOXAZOLE: SIGNIFICANT CHANGE UP
-  VANCOMYCIN: SIGNIFICANT CHANGE UP
ANION GAP SERPL CALC-SCNC: 11 MMO/L — SIGNIFICANT CHANGE UP (ref 7–14)
BACTERIA WND CULT: SIGNIFICANT CHANGE UP
BUN SERPL-MCNC: 13 MG/DL — SIGNIFICANT CHANGE UP (ref 7–23)
CALCIUM SERPL-MCNC: 9.3 MG/DL — SIGNIFICANT CHANGE UP (ref 8.4–10.5)
CHLORIDE SERPL-SCNC: 106 MMOL/L — SIGNIFICANT CHANGE UP (ref 98–107)
CO2 SERPL-SCNC: 24 MMOL/L — SIGNIFICANT CHANGE UP (ref 22–31)
CREAT SERPL-MCNC: 0.73 MG/DL — SIGNIFICANT CHANGE UP (ref 0.5–1.3)
GLUCOSE BLDC GLUCOMTR-MCNC: 112 MG/DL — HIGH (ref 70–99)
GLUCOSE BLDC GLUCOMTR-MCNC: 158 MG/DL — HIGH (ref 70–99)
GLUCOSE BLDC GLUCOMTR-MCNC: 257 MG/DL — HIGH (ref 70–99)
GLUCOSE BLDC GLUCOMTR-MCNC: 262 MG/DL — HIGH (ref 70–99)
GLUCOSE SERPL-MCNC: 81 MG/DL — SIGNIFICANT CHANGE UP (ref 70–99)
HCT VFR BLD CALC: 30.3 % — LOW (ref 34.5–45)
HGB BLD-MCNC: 10 G/DL — LOW (ref 11.5–15.5)
MAGNESIUM SERPL-MCNC: 1.6 MG/DL — SIGNIFICANT CHANGE UP (ref 1.6–2.6)
MCHC RBC-ENTMCNC: 30.3 PG — SIGNIFICANT CHANGE UP (ref 27–34)
MCHC RBC-ENTMCNC: 33 % — SIGNIFICANT CHANGE UP (ref 32–36)
MCV RBC AUTO: 91.8 FL — SIGNIFICANT CHANGE UP (ref 80–100)
METHOD TYPE: SIGNIFICANT CHANGE UP
NRBC # FLD: 0 K/UL — SIGNIFICANT CHANGE UP (ref 0–0)
ORGANISM # SPEC MICROSCOPIC CNT: SIGNIFICANT CHANGE UP
ORGANISM # SPEC MICROSCOPIC CNT: SIGNIFICANT CHANGE UP
PHOSPHATE SERPL-MCNC: 3.9 MG/DL — SIGNIFICANT CHANGE UP (ref 2.5–4.5)
PLATELET # BLD AUTO: 212 K/UL — SIGNIFICANT CHANGE UP (ref 150–400)
PMV BLD: 10.3 FL — SIGNIFICANT CHANGE UP (ref 7–13)
POTASSIUM SERPL-MCNC: 3.9 MMOL/L — SIGNIFICANT CHANGE UP (ref 3.5–5.3)
POTASSIUM SERPL-SCNC: 3.9 MMOL/L — SIGNIFICANT CHANGE UP (ref 3.5–5.3)
RBC # BLD: 3.3 M/UL — LOW (ref 3.8–5.2)
RBC # FLD: 12.6 % — SIGNIFICANT CHANGE UP (ref 10.3–14.5)
SODIUM SERPL-SCNC: 141 MMOL/L — SIGNIFICANT CHANGE UP (ref 135–145)
WBC # BLD: 5.94 K/UL — SIGNIFICANT CHANGE UP (ref 3.8–10.5)
WBC # FLD AUTO: 5.94 K/UL — SIGNIFICANT CHANGE UP (ref 3.8–10.5)

## 2019-11-29 PROCEDURE — 99232 SBSQ HOSP IP/OBS MODERATE 35: CPT

## 2019-11-29 PROCEDURE — 36573 INSJ PICC RS&I 5 YR+: CPT

## 2019-11-29 PROCEDURE — 99223 1ST HOSP IP/OBS HIGH 75: CPT

## 2019-11-29 RX ORDER — CEFAZOLIN SODIUM 1 G
2000 VIAL (EA) INJECTION EVERY 8 HOURS
Refills: 0 | Status: DISCONTINUED | OUTPATIENT
Start: 2019-11-29 | End: 2019-11-29

## 2019-11-29 RX ORDER — CEFAZOLIN SODIUM 1 G
VIAL (EA) INJECTION
Refills: 0 | Status: DISCONTINUED | OUTPATIENT
Start: 2019-11-29 | End: 2019-11-29

## 2019-11-29 RX ORDER — BACITRACIN ZINC 500 UNIT/G
1 OINTMENT IN PACKET (EA) TOPICAL
Qty: 30 | Refills: 0
Start: 2019-11-29 | End: 2019-12-28

## 2019-11-29 RX ORDER — ASPIRIN/CALCIUM CARB/MAGNESIUM 324 MG
1 TABLET ORAL
Qty: 30 | Refills: 0
Start: 2019-11-29 | End: 2019-12-28

## 2019-11-29 RX ORDER — CEFAZOLIN SODIUM 1 G
2 VIAL (EA) INJECTION
Qty: 126 | Refills: 0
Start: 2019-11-29 | End: 2020-01-09

## 2019-11-29 RX ORDER — ACETAMINOPHEN 500 MG
650 TABLET ORAL ONCE
Refills: 0 | Status: COMPLETED | OUTPATIENT
Start: 2019-11-29 | End: 2019-11-29

## 2019-11-29 RX ORDER — ENOXAPARIN SODIUM 100 MG/ML
25 INJECTION SUBCUTANEOUS
Qty: 0 | Refills: 0 | DISCHARGE

## 2019-11-29 RX ADMIN — Medication 100 MILLIGRAM(S): at 21:02

## 2019-11-29 RX ADMIN — Medication 1: at 21:49

## 2019-11-29 RX ADMIN — PIPERACILLIN AND TAZOBACTAM 25 GRAM(S): 4; .5 INJECTION, POWDER, LYOPHILIZED, FOR SOLUTION INTRAVENOUS at 05:35

## 2019-11-29 RX ADMIN — Medication 1 APPLICATION(S): at 05:36

## 2019-11-29 RX ADMIN — Medication 1 APPLICATION(S): at 21:04

## 2019-11-29 RX ADMIN — Medication 250 MILLIGRAM(S): at 02:14

## 2019-11-29 RX ADMIN — ATORVASTATIN CALCIUM 20 MILLIGRAM(S): 80 TABLET, FILM COATED ORAL at 21:02

## 2019-11-29 RX ADMIN — Medication 3: at 12:28

## 2019-11-29 RX ADMIN — Medication 650 MILLIGRAM(S): at 21:02

## 2019-11-29 RX ADMIN — Medication 1: at 17:53

## 2019-11-29 RX ADMIN — ENOXAPARIN SODIUM 40 MILLIGRAM(S): 100 INJECTION SUBCUTANEOUS at 21:03

## 2019-11-29 RX ADMIN — Medication 81 MILLIGRAM(S): at 11:17

## 2019-11-29 RX ADMIN — Medication 650 MILLIGRAM(S): at 21:55

## 2019-11-29 RX ADMIN — INSULIN GLARGINE 10 UNIT(S): 100 INJECTION, SOLUTION SUBCUTANEOUS at 21:48

## 2019-11-29 RX ADMIN — Medication 1 APPLICATION(S): at 13:13

## 2019-11-29 RX ADMIN — Medication 100 MILLIGRAM(S): at 15:17

## 2019-11-29 RX ADMIN — Medication 250 MILLIGRAM(S): at 11:17

## 2019-11-29 NOTE — CONSULT NOTE ADULT - SUBJECTIVE AND OBJECTIVE BOX
HAROON PEPE 74y Female  MRN-[id number]    Patient is a 74y old  Female who presents with a chief complaint of osteomyelitis of the finger (28 Nov 2019 15:17)      HPI:  73yo F h/o DM pw infected right second digit. ot started having swelling and redness to distal phalanx about 1 month ago. was placed on a course of cefuroxime for 7 days and had incision and drainage of ?paronychia. no improvement of infection, was then placed on course of doxycycline, again with no improvement. pt had another I and d yesterday with drainage of blood and pus and started on keflex, also had xr yesterday. received called today with xray report saying extensive OM and pt was told by pmd Dr Arvizu to go to ED. denies fevers, chills or other systemic symptoms (26 Nov 2019 17:14)    Pt found to have right 2nd finger OM. Refuses finger amputation       PAST MEDICAL & SURGICAL HISTORY:  Diabetes  No significant past surgical history      Allergies    No Known Allergies    Intolerances        ANTIMICROBIALS:  piperacillin/tazobactam IVPB.. 3.375 every 8 hours  vancomycin  IVPB 1000 every 12 hours      MEDICATIONS  (STANDING):  aspirin enteric coated 81 milliGRAM(s) Oral daily  atorvastatin 20 milliGRAM(s) Oral at bedtime  BACItracin   Ointment 1 Application(s) Topical three times a day  dextrose 5%. 1000 milliLiter(s) (50 mL/Hr) IV Continuous <Continuous>  dextrose 5%. 1000 milliLiter(s) (50 mL/Hr) IV Continuous <Continuous>  dextrose 5%. 1000 milliLiter(s) (50 mL/Hr) IV Continuous <Continuous>  dextrose 50% Injectable 12.5 Gram(s) IV Push once  dextrose 50% Injectable 25 Gram(s) IV Push once  dextrose 50% Injectable 25 Gram(s) IV Push once  enoxaparin Injectable 40 milliGRAM(s) SubCutaneous daily  insulin glargine Injectable (LANTUS) 10 Unit(s) SubCutaneous at bedtime  insulin lispro (HumaLOG) corrective regimen sliding scale   SubCutaneous three times a day before meals  insulin lispro (HumaLOG) corrective regimen sliding scale   SubCutaneous at bedtime  piperacillin/tazobactam IVPB.. 3.375 Gram(s) IV Intermittent every 8 hours  vancomycin  IVPB 1000 milliGRAM(s) IV Intermittent every 12 hours      Social History  Smoking: no  Etoh: no  Drug use: no      FAMILY HISTORY:  No pertinent family history in first degree relatives      Vital Signs Last 24 Hrs  T(C): 36.7 (29 Nov 2019 05:33), Max: 36.9 (28 Nov 2019 21:54)  T(F): 98 (29 Nov 2019 05:33), Max: 98.5 (28 Nov 2019 21:54)  HR: 67 (29 Nov 2019 05:33) (64 - 71)  BP: 122/59 (29 Nov 2019 05:33) (115/62 - 131/71)  BP(mean): --  RR: 17 (29 Nov 2019 05:33) (17 - 18)  SpO2: 98% (29 Nov 2019 05:33) (98% - 100%)    CBC Full  -  ( 29 Nov 2019 06:48 )  WBC Count : 5.94 K/uL  RBC Count : 3.30 M/uL  Hemoglobin : 10.0 g/dL  Hematocrit : 30.3 %  Platelet Count - Automated : 212 K/uL  Mean Cell Volume : 91.8 fL  Mean Cell Hemoglobin : 30.3 pg  Mean Cell Hemoglobin Concentration : 33.0 %  Auto Neutrophil # : x  Auto Lymphocyte # : x  Auto Monocyte # : x  Auto Eosinophil # : x  Auto Basophil # : x  Auto Neutrophil % : x  Auto Lymphocyte % : x  Auto Monocyte % : x  Auto Eosinophil % : x  Auto Basophil % : x    11-29    141  |  106  |  13  ----------------------------<  81  3.9   |  24  |  0.73    Ca    9.3      29 Nov 2019 06:48  Phos  3.9     11-29  Mg     1.6     11-29            MICROBIOLOGY:  Culture - Wound (11.27.19 @ 11:43)    Culture - Wound:   MODRATE  STAU^Staphylococcus aureus    Specimen Source: OTHER        Peripheral Site 1  11-26-19 --  --  --      RADIOLOGY  < from: MR Hand w/ IV Cont, Right (11.27.19 @ 12:58) >  Soft tissue swelling with edema and enhancement in the second digit   nailbed suspicious for soft tissue infection. No drainable fluid   collection.  Osteomyelitis of the second distal phalanx. Suspected pathologic fracture   at the distal tuft.  Nonspecific tenosynovitis of the fourth flexor tendon.    < end of copied text > HAROON PEPE 74y Female  MRN-[id number]    Patient is a 74y old  Female who presents with a chief complaint of osteomyelitis of the finger (28 Nov 2019 15:17)      HPI:  75yo F h/o DM pw infected right second digit. ot started having swelling and redness to distal phalanx about 1 month ago. was placed on a course of cefuroxime for 7 days and had incision and drainage of ?paronychia. no improvement of infection, was then placed on course of doxycycline, again with no improvement. pt had another I and d yesterday with drainage of blood and pus and started on keflex, also had xr yesterday. received called today with xray report saying extensive OM and pt was told by pmd Dr Arvizu to go to ED. denies fevers, chills or other systemic symptoms (26 Nov 2019 17:14)    Pt found to have right 2nd finger OM. Refuses finger amputation     ID called for 2nd opinion      PAST MEDICAL & SURGICAL HISTORY:  Diabetes  No significant past surgical history      Allergies    No Known Allergies    Intolerances        ANTIMICROBIALS:  piperacillin/tazobactam IVPB.. 3.375 every 8 hours  vancomycin  IVPB 1000 every 12 hours      MEDICATIONS  (STANDING):  aspirin enteric coated 81 milliGRAM(s) Oral daily  atorvastatin 20 milliGRAM(s) Oral at bedtime  BACItracin   Ointment 1 Application(s) Topical three times a day  dextrose 5%. 1000 milliLiter(s) (50 mL/Hr) IV Continuous <Continuous>  dextrose 5%. 1000 milliLiter(s) (50 mL/Hr) IV Continuous <Continuous>  dextrose 5%. 1000 milliLiter(s) (50 mL/Hr) IV Continuous <Continuous>  dextrose 50% Injectable 12.5 Gram(s) IV Push once  dextrose 50% Injectable 25 Gram(s) IV Push once  dextrose 50% Injectable 25 Gram(s) IV Push once  enoxaparin Injectable 40 milliGRAM(s) SubCutaneous daily  insulin glargine Injectable (LANTUS) 10 Unit(s) SubCutaneous at bedtime  insulin lispro (HumaLOG) corrective regimen sliding scale   SubCutaneous three times a day before meals  insulin lispro (HumaLOG) corrective regimen sliding scale   SubCutaneous at bedtime  piperacillin/tazobactam IVPB.. 3.375 Gram(s) IV Intermittent every 8 hours  vancomycin  IVPB 1000 milliGRAM(s) IV Intermittent every 12 hours      Social History  Smoking: no  Etoh: no  Drug use: no      FAMILY HISTORY:  No pertinent family history in first degree relatives      Vital Signs Last 24 Hrs  T(C): 36.7 (29 Nov 2019 05:33), Max: 36.9 (28 Nov 2019 21:54)  T(F): 98 (29 Nov 2019 05:33), Max: 98.5 (28 Nov 2019 21:54)  HR: 67 (29 Nov 2019 05:33) (64 - 71)  BP: 122/59 (29 Nov 2019 05:33) (115/62 - 131/71)  BP(mean): --  RR: 17 (29 Nov 2019 05:33) (17 - 18)  SpO2: 98% (29 Nov 2019 05:33) (98% - 100%)    CBC Full  -  ( 29 Nov 2019 06:48 )  WBC Count : 5.94 K/uL  RBC Count : 3.30 M/uL  Hemoglobin : 10.0 g/dL  Hematocrit : 30.3 %  Platelet Count - Automated : 212 K/uL  Mean Cell Volume : 91.8 fL  Mean Cell Hemoglobin : 30.3 pg  Mean Cell Hemoglobin Concentration : 33.0 %  Auto Neutrophil # : x  Auto Lymphocyte # : x  Auto Monocyte # : x  Auto Eosinophil # : x  Auto Basophil # : x  Auto Neutrophil % : x  Auto Lymphocyte % : x  Auto Monocyte % : x  Auto Eosinophil % : x  Auto Basophil % : x    11-29    141  |  106  |  13  ----------------------------<  81  3.9   |  24  |  0.73    Ca    9.3      29 Nov 2019 06:48  Phos  3.9     11-29  Mg     1.6     11-29            MICROBIOLOGY:  Culture - Wound (11.27.19 @ 11:43)    Culture - Wound:   MODRATE  STAU^Staphylococcus aureus    Specimen Source: OTHER        Peripheral Site 1  11-26-19 --  --  --      RADIOLOGY  < from: MR Hand w/ IV Cont, Right (11.27.19 @ 12:58) >  Soft tissue swelling with edema and enhancement in the second digit   nailbed suspicious for soft tissue infection. No drainable fluid   collection.  Osteomyelitis of the second distal phalanx. Suspected pathologic fracture   at the distal tuft.  Nonspecific tenosynovitis of the fourth flexor tendon.    < end of copied text >

## 2019-11-29 NOTE — CHART NOTE - NSCHARTNOTEFT_GEN_A_CORE
d/w medical attending and ID attending pt is cleared for discharge.    D/w CM IV antibiotics and infusion company set up

## 2019-11-29 NOTE — DISCHARGE NOTE PROVIDER - HOSPITAL COURSE
75yo F h/o DM pw infected right second digit. ot started having swelling and redness to distal phalanx about 1 month ago. was placed on a course of cefuroxime for 7 days and had incision and drainage of ?paronychia. no improvement of infection, was then placed on course of doxycycline, again with no improvement. pt had another I and d yesterday with drainage of blood and pus and started on keflex, also had xr yesterday. received called today with xray report saying extensive OM and pt was told by pmd Dr Arvizu to go to ED. denies fevers, chills or other systemic symptoms            OM - right 2nd digit     - vera pierre     - ID Dr Arias     - Plastics cs: refused amputation: signed off for now     -For now rec soaks TID in warm soapy water;Dressing with bacitracin and dry gauze    - Pt with chronic wound of 2nd digit, s/p multiple courses of abx without improvement.  Xray s/o OM.    - Recommend continuing vanco/zosyn    -MRI of rt hand digit:Soft tissue swelling with edema and enhancement in the second digit     nailbed suspicious for soft tissue infection. No drainable fluid     collection.    Osteomyelitis of the second distal phalanx. Suspected pathologic fracture     at the distal tuft.    Nonspecific tenosynovitis of the fourth flexor tendon.    - Recommend I&D and send cultures for fungal, bacterial and AFB stains/cultures    -11/26: blood cultures:ntd    - Check ESR/CRP: 51 elevated/<4.0    - Pt will likely require PICC line and long term abx.      - Cont local wound care.     -Hep C: Non-reactive     -11/27  wound cx: staph aureus; f/u yeast and fungus ---------------    Pt and family decided to proceed with PICC and IV abx for 6 weeks and obtain 2nd opinion hand surgeon as an outpt             Type 2 diabetes mellitus with hypoglycemia without coma     - Hba1c 7.7%, improved from before as per patient     - Noted to have episode of hypoglycemia last night in setting of giving higher doses of Insulin than her weight based and home regimen. Patient received total of 17 units of Humalog before dinner causing her FS to drop to 60s    - Recommend to start weight base doses which is also similar to home regimen: Lantus 10 units sq qhs    - Hold off on pre meals given hypoglycemia episode as she is very sensitive to insulin    - Continue low dose Humalog correctional scale before meals and low bedtime scale    - Monitor FS before meals and at bedtime    - FS goal 100-180        Essential hypertension    - BP goal <130/80, not on any medications and currently slightly above the goal    - Monitor and consider starting BP meds if persistently high.     -Continue low salt diet         Hyperlipidemia     - LDL goal <70, noted to be 84    - Continue with atorvastatin 20 mg qhs    - Continue with dietary changes     - Monitor lipid profile routinely.         Dispo: Home with Home Care and IV antibiotics

## 2019-11-29 NOTE — CHART NOTE - NSCHARTNOTEFT_GEN_A_CORE
Patient Age: 74    Patient Gender:Female     Procedure (including site/side if known):PICC LINE     Diagnosis/Indication:OM of Rt 2nd digit    Interventional Radiology Attending Physician:    Ordering Attending Physician:Dr. rebolledo    Pertinent medical history:DM, OM Rt 2nd digit     Pertinent Labs:                        10.0   5.94  )-----------( 212      ( 29 Nov 2019 06:48 )             30.3   11-29    141  |  106  |  13  ----------------------------<  81  3.9   |  24  |  0.73    Ca    9.3      29 Nov 2019 06:48  Phos  3.9     11-29  Mg     1.6     11-29        Patient and Family aware? yes      Attending/Resident/NP/PA    Contact:                      [id number]         Date:                    11/29/2019                time:902 Patient Age: 74    Patient Gender:Female     Procedure (including site/side if known):PICC LINE     Diagnosis/Indication:OM of Rt 2nd digit    Interventional Radiology Attending Physician:    Ordering Attending Physician:Dr. rebolledo    Pertinent medical history:DM, OM Rt 2nd digit     Pertinent Labs:                        10.0   5.94  )-----------( 212      ( 29 Nov 2019 06:48 )             30.3   11-29    141  |  106  |  13  ----------------------------<  81  3.9   |  24  |  0.73    Ca    9.3      29 Nov 2019 06:48  Phos  3.9     11-29  Mg     1.6     11-29        Patient and Family aware? yes      Attending/Resident/NP/PA    Contact:                      [id number]         Date:                    11/29/2019                time:904    Approved by Supervisor Noe ESTEVEZ supervisor any questions please call [phone] Patient Age: 74    Patient Gender:Female     Procedure (including site/side if known):PICC LINE     Diagnosis/Indication:OM of Rt 2nd digit    Interventional Radiology Attending Physician:    Ordering Attending Physician:Dr. rebolledo    Pertinent medical history:DM, OM Rt 2nd digit     Pertinent Labs:                        10.0   5.94  )-----------( 212      ( 29 Nov 2019 06:48 )             30.3   11-29    141  |  106  |  13  ----------------------------<  81  3.9   |  24  |  0.73    Ca    9.3      29 Nov 2019 06:48  Phos  3.9     11-29  Mg     1.6     11-29        Patient and Family aware? yes      Attending/Resident/NP/PA    Contact:                      [id number]         Date:                    11/29/2019                time:904    Approved by Supervisor Noe ESTEVEZ supervisor any questions please call [phone]; Discharge Date 11/29

## 2019-11-29 NOTE — PROGRESS NOTE ADULT - SUBJECTIVE AND OBJECTIVE BOX
Patient is a 74y old  Female who presents with a chief complaint of osteomyelitis of the finger (29 Nov 2019 13:50)      INTERVAL HPI/OVERNIGHT EVENTS:  T(C): 36.9 (11-29-19 @ 14:34), Max: 36.9 (11-28-19 @ 21:54)  HR: 77 (11-29-19 @ 14:34) (67 - 77)  BP: 145/76 (11-29-19 @ 14:34) (115/62 - 145/76)  RR: 17 (11-29-19 @ 14:34) (17 - 18)  SpO2: 100% (11-29-19 @ 14:34) (98% - 100%)  Wt(kg): --  I&O's Summary    28 Nov 2019 07:01  -  29 Nov 2019 07:00  --------------------------------------------------------  IN: 800 mL / OUT: 0 mL / NET: 800 mL        LABS:                        10.0   5.94  )-----------( 212      ( 29 Nov 2019 06:48 )             30.3     11-29    141  |  106  |  13  ----------------------------<  81  3.9   |  24  |  0.73    Ca    9.3      29 Nov 2019 06:48  Phos  3.9     11-29  Mg     1.6     11-29          CAPILLARY BLOOD GLUCOSE      POCT Blood Glucose.: 257 mg/dL (29 Nov 2019 12:07)  POCT Blood Glucose.: 112 mg/dL (29 Nov 2019 08:12)  POCT Blood Glucose.: 264 mg/dL (28 Nov 2019 21:53)  POCT Blood Glucose.: 203 mg/dL (28 Nov 2019 17:15)            MEDICATIONS  (STANDING):  aspirin enteric coated 81 milliGRAM(s) Oral daily  atorvastatin 20 milliGRAM(s) Oral at bedtime  BACItracin   Ointment 1 Application(s) Topical three times a day  ceFAZolin   IVPB 2000 milliGRAM(s) IV Intermittent every 8 hours  dextrose 5%. 1000 milliLiter(s) (50 mL/Hr) IV Continuous <Continuous>  dextrose 5%. 1000 milliLiter(s) (50 mL/Hr) IV Continuous <Continuous>  dextrose 5%. 1000 milliLiter(s) (50 mL/Hr) IV Continuous <Continuous>  dextrose 50% Injectable 12.5 Gram(s) IV Push once  dextrose 50% Injectable 25 Gram(s) IV Push once  dextrose 50% Injectable 25 Gram(s) IV Push once  enoxaparin Injectable 40 milliGRAM(s) SubCutaneous daily  insulin glargine Injectable (LANTUS) 10 Unit(s) SubCutaneous at bedtime  insulin lispro (HumaLOG) corrective regimen sliding scale   SubCutaneous three times a day before meals  insulin lispro (HumaLOG) corrective regimen sliding scale   SubCutaneous at bedtime    MEDICATIONS  (PRN):  dextrose 40% Gel 15 Gram(s) Oral once PRN Blood Glucose LESS THAN 70 milliGRAM(s)/deciliter  glucagon  Injectable 1 milliGRAM(s) IntraMuscular once PRN Glucose LESS THAN 70 milligrams/deciliter          PHYSICAL EXAM:  GENERAL: NAD, well-groomed, well-developed  HEAD:  Atraumatic, Normocephalic  CHEST/LUNG: Clear to percussion bilaterally; No rales, rhonchi, wheezing, or rubs  HEART: Regular rate and rhythm; No murmurs, rubs, or gallops  ABDOMEN: Soft, Nontender, Nondistended; Bowel sounds present  EXTREMITIES:  2+ Peripheral Pulses, No clubbing, cyanosis, or edema  LYMPH: No lymphadenopathy noted  SKIN: No rashes or lesions    Care Discussed with Consultants/Other Providers [ ] YES  [ ] NO

## 2019-11-29 NOTE — PROGRESS NOTE ADULT - ASSESSMENT
75yo F h/o DM pw infected right second digit. ot started having swelling and redness to distal phalanx about 1 month ago. was placed on a course of cefuroxime for 7 days and had incision and drainage of ?paronychia. no improvement of infection, was then placed on course of doxycycline, again with no improvement. pt had another I and d yesterday with drainage of blood and pus and started on keflex, also had xr yesterday. received called today with xray report saying extensive OM and pt was told by pmd Dr Arvizu to go to ED. denies fevers, chills or other systemic symptoms    Problem/Plan - 1:  ·  Problem: Osteomyelitis.  Plan: plastics surgery consult appreciated  ID fu  antibiotics as per ID.   MRI of the hand reviewed  pt and family refused amputation and want to try conservative route of iv antibiotics     Problem/Plan - 2:  ·  Problem: Diabetes.  Plan: monitor FS  ISS.   endocrine consult    case dw pt, PA and pts PCP at length   case dw pts son at length

## 2019-11-29 NOTE — DISCHARGE NOTE PROVIDER - PROVIDER TOKENS
PROVIDER:[TOKEN:[[id number]]],PROVIDER:[TOKEN:[[id number]]] PROVIDER:[TOKEN:[[id number]]],PROVIDER:[TOKEN:[[id number]]],PROVIDER:[TOKEN:[[id number]]]

## 2019-11-29 NOTE — CONSULT NOTE ADULT - ASSESSMENT
73yo F h/o DM pw infected right second digit. Pt started having swelling and redness to distal phalanx about 1 month ago. was placed on a course of cefuroxime for 7 days (Oct 21) and had incision and drainage of ?paronychia. no improvement of infection, was then placed on course of doxycycline, again with no improvement. pt had another I and d yesterday with drainage of blood and pus and started on keflex, also had xr yesterday. received called today with xray report saying extensive OM and pt was told by pmd Dr Arvizu to go to ED. denies fevers, chills or other systemic symptoms (26 Nov 2019 17:14)    Pt seen by Plastics, wound was expressed with serosanguinous fluid.   Xray of digit s/o OM.  Pt started on vanco/zosyn.  ID consult called for further abx management.  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  She also does gardening and thinks she may have been pricked by a thorn.    Rt hand 2nd digit OM:    - Pt with chronic wound of 2nd digit, s/p multiple courses of abx without improvement.  Xray s/o OM.    - Recommend continuing vanco/zosyn.  f/u MRI of rt hand digit.    - Recommend I&D and send cultures for fungal, bacterial and AFB stains/cultures.  f/u with Plastics    - f/u blood cultures.    - Check ESR/CRP    - Pt will likely require PICC line and long term abx.      - Cont local wound care.       Will follow,    Hannah Arias  [phone]
73yo F h/o DMT2 (Hba1c 7.7%, on basal + oral at home, no known complications) p/w infected right second digit.   Endocrine consulted for inpatient T2DM management.
73yo F h/o DM pw infected right second digit. ot started having swelling and redness to distal phalanx about 1 month ago with 2nd digit distal phalanx OM with staph aures infection needing long term abx

## 2019-11-29 NOTE — DISCHARGE NOTE NURSING/CASE MANAGEMENT/SOCIAL WORK - NSDCPNDISPN_GEN_ALL_CORE
Side effects of pain management treatment/Activities of daily living, including home environment that might     exacerbate pain or reduce effectiveness of the pain management plan of care as well as strategies to address these issues/Safe use, storage and disposal of opioids when prescribed

## 2019-11-29 NOTE — PROGRESS NOTE ADULT - SUBJECTIVE AND OBJECTIVE BOX
Infectious Diseases progress note:    Subjective: NAD, feels well.  No fever/chills.  Rt hand 2nd digit without erythema.  Distal tip is mildly swollen, mild serous discharge from nail bed.      ROS:  CONSTITUTIONAL:  No fever, chills, rigors  CARDIOVASCULAR:  No chest pain or palpitations  RESPIRATORY:   No SOB, cough, dyspnea on exertion.  No wheezing  GASTROINTESTINAL:  No abd pain, N/V, diarrhea/constipation  EXTREMITIES:  No swelling or joint pain  GENITOURINARY:  No burning on urination, increased frequency or urgency.  No flank pain  NEUROLOGIC:  No HA, visual disturbances  SKIN: No rashes    Allergies    No Known Allergies    Intolerances        ANTIBIOTICS/RELEVANT:  antimicrobials  ceFAZolin   IVPB        immunologic:    OTHER:  aspirin enteric coated 81 milliGRAM(s) Oral daily  atorvastatin 20 milliGRAM(s) Oral at bedtime  BACItracin   Ointment 1 Application(s) Topical three times a day  dextrose 40% Gel 15 Gram(s) Oral once PRN  dextrose 5%. 1000 milliLiter(s) IV Continuous <Continuous>  dextrose 5%. 1000 milliLiter(s) IV Continuous <Continuous>  dextrose 5%. 1000 milliLiter(s) IV Continuous <Continuous>  dextrose 50% Injectable 12.5 Gram(s) IV Push once  dextrose 50% Injectable 25 Gram(s) IV Push once  dextrose 50% Injectable 25 Gram(s) IV Push once  enoxaparin Injectable 40 milliGRAM(s) SubCutaneous daily  glucagon  Injectable 1 milliGRAM(s) IntraMuscular once PRN  insulin glargine Injectable (LANTUS) 10 Unit(s) SubCutaneous at bedtime  insulin lispro (HumaLOG) corrective regimen sliding scale   SubCutaneous three times a day before meals  insulin lispro (HumaLOG) corrective regimen sliding scale   SubCutaneous at bedtime      Objective:  Vital Signs Last 24 Hrs  T(C): 36.7 (29 Nov 2019 05:33), Max: 36.9 (28 Nov 2019 21:54)  T(F): 98 (29 Nov 2019 05:33), Max: 98.5 (28 Nov 2019 21:54)  HR: 67 (29 Nov 2019 05:33) (64 - 71)  BP: 122/59 (29 Nov 2019 05:33) (115/62 - 131/71)  BP(mean): --  RR: 17 (29 Nov 2019 05:33) (17 - 18)  SpO2: 98% (29 Nov 2019 05:33) (98% - 100%)    PHYSICAL EXAM:  Constitutional:NAD  Eyes:ADDISON, EOMI  Ear/Nose/Throat: no thrush, mucositis.  Moist mucous membranes	  Neck:no JVD, no lymphadenopathy, supple  Respiratory: CTA raad  Cardiovascular: S1S2 RRR, no murmurs  Gastrointestinal:soft, nontender,  nondistended (+) BS  Extremities: rt hand 2nd digit without erythema, distal tip is swollen, mild serous drainage expressed from below nail bed.   Skin:  no rashes, open wounds or ulcerations        LABS:                        10.0   5.94  )-----------( 212      ( 29 Nov 2019 06:48 )             30.3     11-29    141  |  106  |  13  ----------------------------<  81  3.9   |  24  |  0.73    Ca    9.3      29 Nov 2019 06:48  Phos  3.9     11-29  Mg     1.6     11-29                  Vancomycin Level, Trough: 13.6 ug/mL (11-27 @ 22:50)              MICROBIOLOGY:    Culture - Wound (11.27.19 @ 11:43)    -  Cefazolin: S <=4 MARTHA    -  Ciprofloxacin: R >2 MARTHA    -  Erythromycin: R >4 MARTHA    -  Gentamicin: S <=1 MARTHA    -  Levofloxacin: R >4 MARTHA    -  Moxifloxacin(Aerobic): R 2 MARTHA    -  Oxacillin: S <=0.25 MARTHA    -  Penicillin: R >8 MARTHA    -  Rifampin: S <=1 MARTHA    -  Tetra/Doxy: S <=1 MARTHA    -  Trimethoprim/Sulfamethoxazole: S <=0.5/9.5 MARTHA    -  Vancomycin: S 2 MARTHA    Culture - Wound:   MODRATE    Specimen Source: OTHER    Organism Identification: Staphylococcus aureus    Organism: Staphylococcus aureus    Method Type: POSITIVE MARTHA 29      Culture - Blood (11.26.19 @ 16:30)    Culture - Blood:   NO ORGANISMS ISOLATED  NO ORGANISMS ISOLATED AT 48 HRS.    Specimen Source: Peripheral Site 2    Culture - Blood (11.26.19 @ 16:30)    Culture - Blood:   NO ORGANISMS ISOLATED  NO ORGANISMS ISOLATED AT 48 HRS.    Specimen Source: Peripheral Site 1        RADIOLOGY & ADDITIONAL STUDIES:    < from: MR Hand w/ IV Cont, Right (11.27.19 @ 12:58) >  FINDINGS:    BONE MARROW: There is abnormal increased T2 signal with hypointense T1   signal and erosive change involving the second distal phalanx. There is   mild spurring at the basilar joint. There is a suspected fracture at the   seconddistal talus..  SYNOVIUM/ JOINT FLUID: No large joint effusion.  TENDONS: Tendons are intact. There is tenosynovitis of the flexor tendon   to the fourth digit. No full-thickness tear or retraction is seen.  MUSCLES: Normal muscle bulk.  NEUROVASCULAR STRUCTURES: Preserved  SUBCUTANEOUS SOFT TISSUES: There is soft tissue swelling surrounding the   second distal phalanx. Suspected skin wound along the dorsal aspect of   the second distal phalanx and overlying bandage. There is edema and   enhancement within the second digit nailbed.    IMPRESSION:    Soft tissue swelling with edema and enhancement in the second digit   nailbed suspicious for soft tissue infection. No drainable fluid   collection.  Osteomyelitis of the second distal phalanx. Suspected pathologic fracture   at the distal tuft.  Nonspecific tenosynovitis of the fourth flexor tendon.    < end of copied text >

## 2019-11-29 NOTE — DISCHARGE NOTE NURSING/CASE MANAGEMENT/SOCIAL WORK - PATIENT PORTAL LINK FT
You can access the FollowMyHealth Patient Portal offered by [facility] by registering at the following website: [email or web address]. By joining [facility]’s FollowMyHealth portal, you will also be able to view your health information using other applications (apps) compatible with our system.

## 2019-11-29 NOTE — DISCHARGE NOTE NURSING/CASE MANAGEMENT/SOCIAL WORK - NSSCNAMETXT_GEN_ALL_CORE
[facility]  nurse will be at your home at 9 am.

## 2019-11-29 NOTE — PROGRESS NOTE ADULT - SUBJECTIVE AND OBJECTIVE BOX
Chief Complaint: DM2    History: Tolerating po.  Going for PICC placement and then dc home later tonight.  No hypoglycemia symptoms.    MEDICATIONS  (STANDING):  aspirin enteric coated 81 milliGRAM(s) Oral daily  atorvastatin 20 milliGRAM(s) Oral at bedtime  BACItracin   Ointment 1 Application(s) Topical three times a day  ceFAZolin   IVPB 2000 milliGRAM(s) IV Intermittent every 8 hours  dextrose 5%. 1000 milliLiter(s) (50 mL/Hr) IV Continuous <Continuous>  dextrose 5%. 1000 milliLiter(s) (50 mL/Hr) IV Continuous <Continuous>  dextrose 5%. 1000 milliLiter(s) (50 mL/Hr) IV Continuous <Continuous>  dextrose 50% Injectable 12.5 Gram(s) IV Push once  dextrose 50% Injectable 25 Gram(s) IV Push once  dextrose 50% Injectable 25 Gram(s) IV Push once  enoxaparin Injectable 40 milliGRAM(s) SubCutaneous daily  insulin glargine Injectable (LANTUS) 10 Unit(s) SubCutaneous at bedtime  insulin lispro (HumaLOG) corrective regimen sliding scale   SubCutaneous three times a day before meals  insulin lispro (HumaLOG) corrective regimen sliding scale   SubCutaneous at bedtime    MEDICATIONS  (PRN):  dextrose 40% Gel 15 Gram(s) Oral once PRN Blood Glucose LESS THAN 70 milliGRAM(s)/deciliter  glucagon  Injectable 1 milliGRAM(s) IntraMuscular once PRN Glucose LESS THAN 70 milligrams/deciliter      Allergies    No Known Allergies    Intolerances      Review of Systems:    ALL OTHER SYSTEMS REVIEWED AND NEGATIVE        PHYSICAL EXAM:  VITALS: T(C): 36.9 (11-29-19 @ 14:34)  T(F): 98.4 (11-29-19 @ 14:34), Max: 98.5 (11-28-19 @ 21:54)  HR: 77 (11-29-19 @ 14:34) (67 - 77)  BP: 145/76 (11-29-19 @ 14:34) (115/62 - 145/76)  RR:  (17 - 18)  SpO2:  (98% - 100%)  Wt(kg): --  GENERAL: NAD, well-groomed, well-developed  EYES: No proptosis, no lid lag, anicteric  HEENT:  Atraumatic, Normocephalic, moist mucous membranes  PSYCH: Alert and oriented x 3, normal affect, normal mood      CAPILLARY BLOOD GLUCOSE      POCT Blood Glucose.: 158 mg/dL (29 Nov 2019 17:26)  POCT Blood Glucose.: 257 mg/dL (29 Nov 2019 12:07)  POCT Blood Glucose.: 112 mg/dL (29 Nov 2019 08:12)  POCT Blood Glucose.: 264 mg/dL (28 Nov 2019 21:53)      11-29    141  |  106  |  13  ----------------------------<  81  3.9   |  24  |  0.73    EGFR if : 95  EGFR if non : 82    Ca    9.3      11-29  Mg     1.6     11-29  Phos  3.9     11-29    TPro  6.7  /  Alb  3.7  /  TBili  0.3  /  DBili  x   /  AST  16  /  ALT  8   /  AlkPhos  45  11-27          Thyroid Function Tests:      Hemoglobin A1C, Whole Blood: 7.7 % <H> [4.0 - 5.6] (11-26-19 @ 23:50)

## 2019-11-29 NOTE — CONSULT NOTE ADULT - PROBLEM SELECTOR RECOMMENDATION 9
-due to staph aureus  -deescalate abx based on sensitivities  -plan 4-6 weeks iv abx  -picc  -agree with Dr. Arias plan and will defer to her  -refusing amputation -due to staph aureus  -deescalate abx based on sensitivities  -plan 4-6 weeks iv abx  -picc  -agree with Dr. Arias plan and will defer to her  -refusing amputation  -DM control

## 2019-11-29 NOTE — DISCHARGE NOTE PROVIDER - CARE PROVIDER_API CALL
Diego Arvizu)  Internal Medicine  [address] [place] [address]  Phone: [phone]  Fax: [phone]  Follow Up Time:     Zahida Simon)  EndocrinologyMetabDiabetes; Internal Medicine  [address] [place] [address]  Phone: [phone]  Fax: [phone]  Follow Up Time: Diego Arvizu)  Internal Medicine  [address] [place] [address]  Phone: [phone]  Fax: [phone]  Follow Up Time:     Zahida Simon)  EndocrinologyMetabDiabetes; Internal Medicine  [address] [place] [address]  Phone: [phone]  Fax: [phone]  Follow Up Time:     Hannah Arias)  Infectious Disease; Internal Medicine  [address] [place] [address]  Phone: [phone]  Fax: [phone]  Follow Up Time:

## 2019-11-29 NOTE — PROGRESS NOTE ADULT - ASSESSMENT
75yo F h/o DMT2 (Hba1c 7.7%, on basal + oral at home, no known complications) p/w infected right second digit.   Endocrine consulted for inpatient T2DM management.     1) DM2  While inpatient - if staying in hospital can lower Lantus to 8 units qhs and add Humalog 2/2/2 and continue low correction scales.    DC plan: Can resume prior home regimen Lantus 10 units qhs plus Janumet.  Outpatient endocrine follow up to discuss other treatment options as well as CGM. [phone].

## 2019-11-29 NOTE — PROGRESS NOTE ADULT - ASSESSMENT
75yo F h/o DM pw infected right second digit. Pt started having swelling and redness to distal phalanx about 1 month ago. was placed on a course of cefuroxime for 7 days (Oct 21) and had incision and drainage of ?paronychia. no improvement of infection, was then placed on course of doxycycline, again with no improvement. pt had another I and d yesterday with drainage of blood and pus and started on keflex, also had xr yesterday. received called today with xray report saying extensive OM and pt was told by pmd Dr Arvizu to go to ED. denies fevers, chills or other systemic symptoms (26 Nov 2019 17:14)    Pt seen by Plastics, wound was expressed with serosanguinous fluid.   Xray of digit s/o OM.  Pt started on vanco/zosyn.  ID consult called for further abx management.  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  She also does gardening and thinks she may have been pricked by a thorn.    Rt hand 2nd digit OM:    - Pt with chronic wound of 2nd digit, s/p multiple courses of abx without improvement.  Xray s/o OM.    - MRI of rt hand digit - findings positive for OM of distal phalanx, no drainable fluid collection.    - Bacterial and fungal cultures sent  from fluid expressed from nail bed - growing staph aureus    - f/u blood cultures - NGTD      - Pt wishes to hold off on amputation at this time, has had discussion with plastic surgery.  Will plan for PICC line and long term abx.   Discussed risks of PICC line (infection, blood clot) and long term IV abx (cdiff, allergy, drug side effects, increased drug resistance).  Pt understands and wishes to continue with abx for now.      - Cont local wound care.     - Wound cultures growing MSSA.  Narrow abx coverage to cefazolin 2gm IV q8 for total 6 week course (11/26 through 1/6)    d/w pt and son at bedside.  Outpt f/u with ID.  Pt also plans to make appt with an outpt hand surgeon of her choice.            Hannah Arias  [phone]

## 2019-11-29 NOTE — CONSULT NOTE ADULT - PROBLEM SELECTOR PROBLEM 1
Type 2 diabetes mellitus with hypoglycemia without coma, with long-term current use of insulin
Finger osteomyelitis, right

## 2019-11-29 NOTE — DISCHARGE NOTE PROVIDER - NSDCCPCAREPLAN_GEN_ALL_CORE_FT
PRINCIPAL DISCHARGE DIAGNOSIS  Diagnosis: Finger osteomyelitis, right  Assessment and Plan of Treatment: you were found to have osteomyelitis of the right 2nd digit .  You were seen by the plastic surgeon which recommended amputation or trial of antibiotics. Your wound culture grew MSSA sensitive to cefazolin .  You decided to receive IV antibiotics via PICC line and to obtain a 2nd opininon hand surgeon as an outpt.  Continue antibiotics as prescribed Cefazolin 2gram q 8hours through 1/7, 2020 via PICC line . PICC care as per protocol.   Please obtain weekly labs cbc, bmp, crp, esr weekly.   Please follow up with your pcp Dr. Parra within 1 week of discharge.  Please call to make an appointment within 1 week of discharge.  Please follow up with infectious disease physician on 11/16 at 2pm with Dr. Arias.  Continue wound care:-For now rec soaks TID in warm soapy water;Dressing with bacitracin and dry gauze.      SECONDARY DISCHARGE DIAGNOSES  Diagnosis: Essential hypertension  Assessment and Plan of Treatment: Continue blood pressure medication regimen as directed. Monitor for any visual changes, headaches or dizziness.  Monitor blood pressure regularly.  Follow up with your PCP for further management for high blood pressure. Please continue to monitor your blood pressure and continue a low salt diet .    Diagnosis: Type 2 diabetes mellitus with hypoglycemia without coma, with long-term current use of insulin  Assessment and Plan of Treatment: Continue your medication regimen and a consistent carbohydrate diet (Meaning eating the same amount of carbohydrates at the same time each day). Monitor blood glucose levels throughout the day before meals and at bedtime. Record blood sugars and bring to outpatient providers appointment in order to be reviewed by your doctor for management modifications. If your sugars are more than 400 or less than 70 you should contact your PCP immediately. Monitor for signs/symptoms of low blood glucose, such as, dizziness, altered mental status, or cool/clammy skin. In addition, monitor for signs/symptoms of high blood glucose, such as, feeling hot, dry, fatigued, or with increased thirst/urination. Make regular podiatry appointments in order to have feet checked for wounds and uncontrolled toe nail growth to prevent infections, as well as, appointments with an ophthalmologist to monitor your vision.  The endocrinologist  Shannan Navarro was emailed with  your infection. You will receive a phone call from endocrinology with an appointment.  If you do not hear from the endocrine team.  Please call [facility]  [address], [address], [place] [address]  [phone] .  You inquired about Dexcom.  This process is started as an outpt.    Diagnosis: Hyperlipidemia, unspecified hyperlipidemia type  Assessment and Plan of Treatment: Continue cholesterol control medications. Continue DASH diet. Follow up with your PCP within 1 week of discharge for further management and monitoring of lipid and cholesterol panels.

## 2019-11-29 NOTE — CONSULT NOTE ADULT - ATTENDING COMMENTS
DM2 s/p hypoglycemic event due to large dose of insulin inpatient.  Agree with Lantus 10 units qhs (home dose) and low Humalog correction scales.  Will follow. Can resume prior home regimen at discharge.  Would like to follow with endocrine upon dc [phone].
Darrin Sanz  Attending Physician   Division of Infectious Disease  Pager #[phone]  After 5pm/weekend or no response, call #[phone]    Will sign off, recall ID if needed #[phone].

## 2019-11-29 NOTE — CONSULT NOTE ADULT - PROBLEM SELECTOR RECOMMENDATION 2
- BP goal <130/80, not on any medications and currently slightly above the goal  - Monitor and consider starting BP meds if persistently high
-plan 4-6 weeks abx  -potential side effects of PICC explained including bleeding and infection  -potential side effects of abx explained including GI, Cdiff, allergy issues, development of resistance, etc.

## 2019-11-29 NOTE — PROGRESS NOTE ADULT - REASON FOR ADMISSION
osteomyelitis of the finger

## 2019-11-29 NOTE — DISCHARGE NOTE PROVIDER - NSDCMRMEDTOKEN_GEN_ALL_CORE_FT
atorvastatin 20 mg oral tablet: 1 tab(s) orally once a day  ceFAZolin 2 g/50 mL-NaCl 0.9% intravenous solution: 2 gram(s) intravenous every 8 hours through January 7/2020  Janumet 50 mg-500 mg oral tablet: 1 tab(s) orally 2 times a day  LABS WEEKLY : cbc, cmp, esr, crp weekly   Lantus Solostar Pen 100 units/mL subcutaneous solution: 25 unit(s) subcutaneous once a day (at bedtime)  PICC LINE CARE : Please flush picc before and after administration of medications as per protocol.  Please change picc dressing weekly aspirin 81 mg oral delayed release tablet: 1 tab(s) orally once a day  atorvastatin 20 mg oral tablet: 1 tab(s) orally once a day  bacitracin 500 units/g topical ointment: 1 application topically 3 times a day  ceFAZolin 2 g/50 mL-NaCl 0.9% intravenous solution: 2 gram(s) intravenous every 8 hours through January 7/2020  Janumet 50 mg-500 mg oral tablet: 1 tab(s) orally 2 times a day  LABS WEEKLY : cbc, cmp, esr, crp weekly   Arpit Cruzar Pen 100 units/mL subcutaneous solution: 10 unit(s) subcutaneous once a day (at bedtime)  PICC LINE CARE : Please flush picc before and after administration of medications as per protocol.  Please change picc dressing weekly aspirin 81 mg oral delayed release tablet: 1 tab(s) orally once a day  atorvastatin 20 mg oral tablet: 1 tab(s) orally once a day  bacitracin 500 units/g topical ointment: 1 application topically 3 times a day  ceFAZolin 2 g/50 mL-NaCl 0.9% intravenous solution: 2 gram(s) intravenous every 8 hours through January 7/2020  glucometer (per patient&#x27;s insurance): Test blood sugars four times a day. Dispense #1 glucometer.  Janumet 50 mg-500 mg oral tablet: 1 tab(s) orally 2 times a day  LABS WEEKLY : cbc, cmp, esr, crp weekly   Lantus Solostar Pen 100 units/mL subcutaneous solution: 10 unit(s) subcutaneous once a day (at bedtime)  PICC LINE CARE : Please flush picc before and after administration of medications as per protocol.  Please change picc dressing weekly

## 2019-11-29 NOTE — DISCHARGE NOTE PROVIDER - CARE PROVIDERS DIRECT ADDRESSES
,DirectAddress_Unknown,[email or web address] ,DirectAddress_Unknown,[email or web address],DirectAddress_Unknown

## 2019-12-01 LAB
BACTERIA BLD CULT: SIGNIFICANT CHANGE UP
BACTERIA BLD CULT: SIGNIFICANT CHANGE UP

## 2019-12-02 ENCOUNTER — INBOUND DOCUMENT (OUTPATIENT)
Age: 74
End: 2019-12-02

## 2019-12-02 PROBLEM — E11.9 TYPE 2 DIABETES MELLITUS WITHOUT COMPLICATIONS: Chronic | Status: ACTIVE | Noted: 2019-11-26

## 2019-12-02 LAB — SPECIMEN SOURCE: SIGNIFICANT CHANGE UP

## 2019-12-03 ENCOUNTER — APPOINTMENT (OUTPATIENT)
Dept: VASCULAR SURGERY | Facility: CLINIC | Age: 74
End: 2019-12-03
Payer: MEDICARE

## 2019-12-03 VITALS
SYSTOLIC BLOOD PRESSURE: 153 MMHG | HEART RATE: 57 BPM | WEIGHT: 125 LBS | BODY MASS INDEX: 22.15 KG/M2 | TEMPERATURE: 98.6 F | DIASTOLIC BLOOD PRESSURE: 89 MMHG | HEIGHT: 63 IN

## 2019-12-03 PROCEDURE — 99203 OFFICE O/P NEW LOW 30 MIN: CPT

## 2019-12-05 ENCOUNTER — APPOINTMENT (OUTPATIENT)
Dept: ORTHOPEDIC SURGERY | Facility: CLINIC | Age: 74
End: 2019-12-05
Payer: MEDICARE

## 2019-12-05 VITALS — HEIGHT: 63 IN | WEIGHT: 125 LBS | BODY MASS INDEX: 22.15 KG/M2

## 2019-12-05 VITALS — DIASTOLIC BLOOD PRESSURE: 79 MMHG | SYSTOLIC BLOOD PRESSURE: 164 MMHG | HEART RATE: 69 BPM

## 2019-12-05 DIAGNOSIS — Z86.39 PERSONAL HISTORY OF OTHER ENDOCRINE, NUTRITIONAL AND METABOLIC DISEASE: ICD-10-CM

## 2019-12-05 PROCEDURE — 99203 OFFICE O/P NEW LOW 30 MIN: CPT | Mod: 25

## 2019-12-05 PROCEDURE — 26010 DRAINAGE OF FINGER ABSCESS: CPT | Mod: F6

## 2019-12-05 RX ORDER — BACITRACIN ZINC 500 UNIT/G
500 OINTMENT (GRAM) TOPICAL
Refills: 0 | Status: ACTIVE | COMMUNITY

## 2019-12-09 ENCOUNTER — APPOINTMENT (OUTPATIENT)
Dept: ORTHOPEDIC SURGERY | Facility: CLINIC | Age: 74
End: 2019-12-09
Payer: MEDICARE

## 2019-12-09 DIAGNOSIS — Z86.39 PERSONAL HISTORY OF OTHER ENDOCRINE, NUTRITIONAL AND METABOLIC DISEASE: ICD-10-CM

## 2019-12-09 DIAGNOSIS — Z78.9 OTHER SPECIFIED HEALTH STATUS: ICD-10-CM

## 2019-12-09 PROCEDURE — 99024 POSTOP FOLLOW-UP VISIT: CPT

## 2019-12-19 ENCOUNTER — APPOINTMENT (OUTPATIENT)
Dept: ORTHOPEDIC SURGERY | Facility: CLINIC | Age: 74
End: 2019-12-19
Payer: MEDICARE

## 2019-12-19 VITALS
HEART RATE: 77 BPM | BODY MASS INDEX: 22.15 KG/M2 | DIASTOLIC BLOOD PRESSURE: 78 MMHG | HEIGHT: 63 IN | SYSTOLIC BLOOD PRESSURE: 143 MMHG | WEIGHT: 125 LBS

## 2019-12-19 PROCEDURE — 99214 OFFICE O/P EST MOD 30 MIN: CPT

## 2020-01-05 LAB — FUNGUS SPEC QL CULT: SIGNIFICANT CHANGE UP

## 2020-01-06 ENCOUNTER — OUTPATIENT (OUTPATIENT)
Dept: OUTPATIENT SERVICES | Facility: HOSPITAL | Age: 75
LOS: 1 days | End: 2020-01-06
Payer: MEDICARE

## 2020-01-06 ENCOUNTER — APPOINTMENT (OUTPATIENT)
Dept: RADIOLOGY | Facility: IMAGING CENTER | Age: 75
End: 2020-01-06
Payer: MEDICARE

## 2020-01-06 DIAGNOSIS — Z00.8 ENCOUNTER FOR OTHER GENERAL EXAMINATION: ICD-10-CM

## 2020-01-06 PROCEDURE — 73130 X-RAY EXAM OF HAND: CPT

## 2020-01-06 PROCEDURE — 73130 X-RAY EXAM OF HAND: CPT | Mod: 26,RT

## 2020-01-13 ENCOUNTER — APPOINTMENT (OUTPATIENT)
Dept: ENDOCRINOLOGY | Facility: CLINIC | Age: 75
End: 2020-01-13
Payer: MEDICARE

## 2020-01-13 VITALS
DIASTOLIC BLOOD PRESSURE: 80 MMHG | SYSTOLIC BLOOD PRESSURE: 140 MMHG | BODY MASS INDEX: 21.97 KG/M2 | HEART RATE: 81 BPM | WEIGHT: 124 LBS | OXYGEN SATURATION: 98 % | HEIGHT: 63 IN

## 2020-01-13 PROCEDURE — 99215 OFFICE O/P EST HI 40 MIN: CPT

## 2020-01-13 RX ORDER — ATORVASTATIN CALCIUM 20 MG/1
20 TABLET, FILM COATED ORAL
Refills: 0 | Status: ACTIVE | COMMUNITY

## 2020-01-14 ENCOUNTER — RX RENEWAL (OUTPATIENT)
Age: 75
End: 2020-01-14

## 2020-01-15 ENCOUNTER — RX RENEWAL (OUTPATIENT)
Age: 75
End: 2020-01-15

## 2020-01-16 ENCOUNTER — CLINICAL ADVICE (OUTPATIENT)
Age: 75
End: 2020-01-16

## 2020-01-16 LAB
ALBUMIN SERPL ELPH-MCNC: 4.6 G/DL
ALP BLD-CCNC: 47 U/L
ALT SERPL-CCNC: 8 U/L
ANION GAP SERPL CALC-SCNC: 11 MMOL/L
AST SERPL-CCNC: 18 U/L
BILIRUB SERPL-MCNC: 0.3 MG/DL
BUN SERPL-MCNC: 16 MG/DL
C PEPTIDE SERPL-MCNC: 0.8 NG/ML
CALCIUM SERPL-MCNC: 10 MG/DL
CHLORIDE SERPL-SCNC: 102 MMOL/L
CHOLEST SERPL-MCNC: 181 MG/DL
CHOLEST/HDLC SERPL: 2.4 RATIO
CO2 SERPL-SCNC: 28 MMOL/L
CREAT SERPL-MCNC: 0.69 MG/DL
CREAT SPEC-SCNC: 34 MG/DL
GLUCOSE SERPL-MCNC: 142 MG/DL
HDLC SERPL-MCNC: 77 MG/DL
LDLC SERPL CALC-MCNC: 93 MG/DL
MICROALBUMIN 24H UR DL<=1MG/L-MCNC: <1.2 MG/DL
MICROALBUMIN/CREAT 24H UR-RTO: NORMAL MG/G
POTASSIUM SERPL-SCNC: 4.9 MMOL/L
PROT SERPL-MCNC: 7.5 G/DL
SODIUM SERPL-SCNC: 141 MMOL/L
TRIGL SERPL-MCNC: 57 MG/DL
TSH SERPL-ACNC: 3.79 UIU/ML
VIT B12 SERPL-MCNC: 1061 PG/ML

## 2020-01-22 ENCOUNTER — APPOINTMENT (OUTPATIENT)
Dept: ENDOCRINOLOGY | Facility: CLINIC | Age: 75
End: 2020-01-22
Payer: MEDICARE

## 2020-01-22 PROCEDURE — G0108 DIAB MANAGE TRN  PER INDIV: CPT

## 2020-01-27 ENCOUNTER — APPOINTMENT (OUTPATIENT)
Dept: ORTHOPEDIC SURGERY | Facility: CLINIC | Age: 75
End: 2020-01-27
Payer: MEDICARE

## 2020-01-27 DIAGNOSIS — M86.9 OSTEOMYELITIS, UNSPECIFIED: ICD-10-CM

## 2020-01-27 PROCEDURE — 99214 OFFICE O/P EST MOD 30 MIN: CPT

## 2020-01-30 ENCOUNTER — CLINICAL ADVICE (OUTPATIENT)
Age: 75
End: 2020-01-30

## 2020-02-03 LAB
ALBUMIN SERPL ELPH-MCNC: 4.2 G/DL
ALP BLD-CCNC: 47 U/L
ALT SERPL-CCNC: 9 U/L
ANION GAP SERPL CALC-SCNC: 12 MMOL/L
AST SERPL-CCNC: 19 U/L
BILIRUB SERPL-MCNC: 0.2 MG/DL
BUN SERPL-MCNC: 17 MG/DL
CALCIUM SERPL-MCNC: 9.6 MG/DL
CHLORIDE SERPL-SCNC: 105 MMOL/L
CO2 SERPL-SCNC: 24 MMOL/L
CREAT SERPL-MCNC: 0.75 MG/DL
GLUCOSE SERPL-MCNC: 105 MG/DL
POTASSIUM SERPL-SCNC: 5 MMOL/L
PROT SERPL-MCNC: 6.7 G/DL
SODIUM SERPL-SCNC: 141 MMOL/L

## 2020-02-04 LAB — C PEPTIDE SERPL-MCNC: 0.8 NG/ML

## 2020-02-04 RX ORDER — SITAGLIPTIN 100 MG/1
100 TABLET, FILM COATED ORAL
Qty: 90 | Refills: 2 | Status: ACTIVE | COMMUNITY
Start: 2020-01-14 | End: 1900-01-01

## 2020-02-05 RX ORDER — INSULIN GLARGINE 100 [IU]/ML
100 INJECTION, SOLUTION SUBCUTANEOUS
Qty: 1 | Refills: 3 | Status: ACTIVE | COMMUNITY
Start: 2020-02-05 | End: 1900-01-01

## 2020-04-06 ENCOUNTER — APPOINTMENT (OUTPATIENT)
Dept: ENDOCRINOLOGY | Facility: CLINIC | Age: 75
End: 2020-04-06

## 2020-04-27 ENCOUNTER — APPOINTMENT (OUTPATIENT)
Dept: ENDOCRINOLOGY | Facility: CLINIC | Age: 75
End: 2020-04-27

## 2021-02-18 NOTE — DISCHARGE NOTE PROVIDER - NSDCDCMDCOMP_GEN_ALL_CORE
ED Time Seen By Provider Entered On:  1/23/2017 6:08     Performed On:  1/23/2017 6:08  by Abigail Russell NP      Time Seen By Provider   Time Seen by Provider :   1/23/2017 06:08    Abigail Russell NP - 1/23/2017 6:08            This document is complete and the patient is ready for discharge.

## 2021-08-31 ENCOUNTER — APPOINTMENT (OUTPATIENT)
Dept: ULTRASOUND IMAGING | Facility: IMAGING CENTER | Age: 76
End: 2021-08-31
Payer: MEDICARE

## 2021-08-31 ENCOUNTER — OUTPATIENT (OUTPATIENT)
Dept: OUTPATIENT SERVICES | Facility: HOSPITAL | Age: 76
LOS: 1 days | End: 2021-08-31
Payer: MEDICARE

## 2021-08-31 DIAGNOSIS — Z00.8 ENCOUNTER FOR OTHER GENERAL EXAMINATION: ICD-10-CM

## 2021-08-31 PROCEDURE — 93970 EXTREMITY STUDY: CPT | Mod: 26

## 2021-08-31 PROCEDURE — 93970 EXTREMITY STUDY: CPT

## 2022-07-21 NOTE — PATIENT PROFILE ADULT - FALLEN IN THE PAST
Bcc Infiltrative Histology Text: There were numerous aggregates of basaloid cells demonstrating an infiltrative pattern. no

## 2023-03-20 ENCOUNTER — APPOINTMENT (OUTPATIENT)
Dept: ENDOCRINOLOGY | Facility: CLINIC | Age: 78
End: 2023-03-20
Payer: MEDICARE

## 2023-03-20 VITALS
WEIGHT: 120 LBS | DIASTOLIC BLOOD PRESSURE: 70 MMHG | HEIGHT: 63 IN | SYSTOLIC BLOOD PRESSURE: 130 MMHG | BODY MASS INDEX: 21.26 KG/M2 | OXYGEN SATURATION: 98 % | HEART RATE: 91 BPM

## 2023-03-20 DIAGNOSIS — R79.89 OTHER SPECIFIED ABNORMAL FINDINGS OF BLOOD CHEMISTRY: ICD-10-CM

## 2023-03-20 PROCEDURE — 95251 CONT GLUC MNTR ANALYSIS I&R: CPT

## 2023-03-20 PROCEDURE — 99204 OFFICE O/P NEW MOD 45 MIN: CPT | Mod: 25

## 2023-03-20 RX ORDER — FLASH GLUCOSE SENSOR
KIT MISCELLANEOUS
Qty: 6 | Refills: 3 | Status: ACTIVE | COMMUNITY
Start: 2023-03-20 | End: 1900-01-01

## 2023-03-20 NOTE — ASSESSMENT
[FreeTextEntry1] : 1. Diabetes Mellitus \par Type: 2\par A1c: 7.8% 3/7/2023\par Current Regimen: Lantus 8 units at bedtime, Janumet 50/500 twice a day\par \par Previously noted low C-peptide, and she was continued on Lantus.  A1c very elevated when poorly compliant with Lantus, now better controlled when restarted Lantus.\par \par PLAN: \par - Regimen: \par      Continue Lantus 8 units at bedtime.  If noting persistent fasting hypoglycemia, can decrease to 6 units at bedtime.\par      Continue Janumet 50/500 twice daily.\par      Check C-peptide and BMP.  Depending on C-peptide level, can recommend taking short acting insulin versus Prandin with a large carb load to prevent postprandial hyperglycemia (she notices blood sugars up to 250 with high carb load). \par \par - BG monitoring: Continue freestyle saray. Will try to get 90 day supply, she is traveling to [facility] 4/2/23. \par \par - Labs: BMP, C-peptide\par \par - Preventive: \par        Nephropathy screening: Urine ACR negative 3/7/2023\par        Retinopathy screening: June 2022 negative per patient\par        Foot exam/podiatrist: Foot exam 3/20/2023 within normal range\par \par - Counseling: We discussed diabetes foot care, long term complications of diabetes including but not limited to neuropathy, nephropathy, retinopathy and cardiovascular disease and the benefits of good glycemic control in preventing said complications. We discussed the risks and benefits of diabetes medications and/or insulin as relevant for today, prevention and management of hypoglycemia, importance of medication compliance and blood glucose monitoring.\par \par 2. Hyperlipidemia\par Last LDL: 95 from 3/7/2023\par LDL goal: <100\par - Continue atorvastatin 20mg daily \par \par Return to clinic in 3 months. \par \par Milvia Mandujano MD\par [facility]\par Endocrinology at [place] \par [address], [address]\par Ph: [phone]\par Fax: [phone]\par

## 2023-03-20 NOTE — HISTORY OF PRESENT ILLNESS
[FreeTextEntry1] : CHIEF COMPLAINT: Type 2 DM \par \par REFERRED BY: Former patient of Dr. Stein, >3 years since last visit\par \par Available prior medical records reviewed. \par \par HISTORY OF PRESENTING ILLNESS:\par The patient is a 77 year old F being seen in the office today for evaluation of diabetes.  Also with hyperlipidemia.  Reports that she often goes to [place], where she eats a lot of rice as she cannot control what she is cooking.  Last time she went to [place] was in July 2022, and when she returned in September 2022 A1c was 10%.  She had also missed insulin doses while in [place].  After restarting insulin upon returning to the , A1c from 3/2023 is 7.8%.\par \par DIABETES HPI: \par Type of Diabetes: 2\par Duration of Diabetes: >20 years ago\par \par A1c: 7.8%, 3/7/2023\par \par Current home regimen: Lantus 8 units at bedtime, Janumet 50/500 mg twice daily\par Compliance: Did not take Lantus when she was traveling in [facility], compliant with Janumet.  Now compliant with Lantus also.\par \par Diabetes complications: \par Microvascular: No known neuropathy, nephropathy or retinopathy.\par Macrovascular: No known CAD, CVA or PAD.\par \par Last retinopathy screening: June/2022, normal per patient\par Last nephropathy screening (urine ACR): Urine ACR negative 3/2023\par Last foot exam/podiatry visit: Foot exam 3/20/2023\par \par BG self monitoring: Freestyle saray\par CGM review 3/7/2023 to 3/20/2023\par Percentage CGM active 73%\par Time in range 73%, high 19%, very high 7%, low 1%, very low 0%\par Average glucose 156, GMI 7%\par Pattern: Occasional postprandial hyperglycemia, occasional fasting hypoglycemia.\par She reports that she gets overnight hypoglycemia if she does not eat a good amount at nighttime.  She reports that she gets postprandial hyperglycemia with eating rice and a high carb load.\par \par Diet:\par Breakfast: Lentils, banana, avocado, plantain or egg\par Lunch: 3 tablespoons of rice, lentils, beans, fish\par Dinner: Smoothie made of vegetables/fruits/nuts\par Snacks: Biscuits or fruits\par Exercise: Walks daily in the summertime, minimal exercise in the wintertime.\par \par Comorbidities: \par Hyperlipidemia: Taking atorvastatin 20 mg daily\par \par Family Hx: Father and sisters with DM\par \par \par PERTINENT LABS: \par \par 3/7/2023 outside labs reviewed\par LDL 95\par eGFR 87\par Urine ACR negative\par TSH 5.22\par Free T41.2\par Vitamin B12 557\par 25 [place] vitamin D 38\par A1c 7%\par \par \par

## 2023-03-21 ENCOUNTER — NON-APPOINTMENT (OUTPATIENT)
Age: 78
End: 2023-03-21

## 2023-03-21 LAB
ANION GAP SERPL CALC-SCNC: 13 MMOL/L
BUN SERPL-MCNC: 14 MG/DL
C PEPTIDE SERPL-MCNC: 1.4 NG/ML
CALCIUM SERPL-MCNC: 10.1 MG/DL
CHLORIDE SERPL-SCNC: 104 MMOL/L
CO2 SERPL-SCNC: 25 MMOL/L
CREAT SERPL-MCNC: 0.81 MG/DL
EGFR: 76 ML/MIN/1.73M2
GLUCOSE SERPL-MCNC: 146 MG/DL
POTASSIUM SERPL-SCNC: 5.1 MMOL/L
SODIUM SERPL-SCNC: 142 MMOL/L

## 2023-03-21 RX ORDER — REPAGLINIDE 0.5 MG/1
0.5 TABLET ORAL
Qty: 270 | Refills: 3 | Status: ACTIVE | COMMUNITY
Start: 2023-03-21 | End: 1900-01-01

## 2023-04-04 ENCOUNTER — APPOINTMENT (OUTPATIENT)
Dept: ENDOCRINOLOGY | Facility: CLINIC | Age: 78
End: 2023-04-04

## 2023-04-06 ENCOUNTER — OFFICE (OUTPATIENT)
Dept: URBAN - METROPOLITAN AREA CLINIC 34 | Facility: CLINIC | Age: 78
Setting detail: OPHTHALMOLOGY
End: 2023-04-06
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
## 2023-04-06 PROCEDURE — NO SHOW FE NO SHOW FEE: Performed by: OPHTHALMOLOGY

## 2023-04-06 NOTE — ED PROVIDER NOTE - CROS ED ROS STATEMENT
Subjective   Patient ID: Vikash is a 11 year old male who is accompanied by: mother     Chief Complaint   Patient presents with   • Follow-up     Throat clearing     • Congestion       HPI  Reports that he completed a 10 day course of augmentin for strep throat on 4/4/23 and still has intermittent throat clearing and congestion. Parent reports that they went to an urgent care outside of [place] for sore throat, congestion, cough. He was rapid tested for strep and negative, and strep diagnosis made based on physical exam findings in throat. No throat culture completed. Reports that augmentin dose was 6.25 ml twice daily. (Unsure of concentration)    Patient reports that sore throat resolved only yesterday, and did not improve after 48 hours on antibiotics.     In the last 2 days has been sneezing more, with throat clearing. Past history of allergic rhinitis    Review of Systems   Constitutional: Negative for activity change, appetite change and fever.   HENT: Positive for postnasal drip, rhinorrhea and sneezing. Negative for congestion and sore throat.    Respiratory: Negative for cough, shortness of breath and wheezing.    Gastrointestinal: Negative for nausea and vomiting.   Genitourinary: Negative for decreased urine volume.   Skin: Negative for rash.   Allergic/Immunologic: Positive for environmental allergies.   All other systems reviewed and are negative.      Patient's medications, allergies, past medical, surgical, social and family histories were reviewed and updated as appropriate.    Objective   Vitals:/65   Pulse 108   Temp 97.2 °F (36.2 °C)   Ht 5' 0.04\" (1.525 m)   Wt (!) 75 kg (165 lb 5.5 oz)   BMI 32.25 kg/m²   BSA 1.72 m²   Physical Exam    Assessment   Diagnoses and all orders for this visit:  Sore throat  -     Throat, Bacterial Culture  Other orders  -     fluticasone (Flonase Sensimist) 27.5 MCG/SPRAY nasal spray; Spray 2 sprays in each nostril daily.  -     cetirizine (ZyrTEC) 5  MG/5ML solution; Take 10 mLs by mouth daily.       Post nasal drip, sore throat - suspect allergic rhinitis vs. Lingering viral URI symptoms. Other ddx includes strep throat undertreated, though lower suspicion based on exam today  - No signs of respiratory distress or dehydration today. Doing well.  - throat cx sent for confirmation if strep throat/ if incomplete treatment of strep. Augmentin dosing for his age/weight with 400-57 concentration is 10.9 ml twice daily (max dosing). If positive will consider sending amoxicillin 1000mg BID x 10 d  based on patient weight. Instructed family to switch out toothbrushes.  - Return precautions reviewed in detail: difficulty breathing, decreased UOP, poor PO intake, fatigue, severe pain, new concerning symptoms    Allergic Rhinitis   - saline nasal spray prn congestion and nose rinses; zyrtec; and flonase daily discussed  - discussed allergen reduction tips -  dust, outdoor allergens, avoid use of fragrances/candles/plug ins  - Return precautions reviewed    Instructed to call if problem worsens or does not improve within the next 24 hours otherwise follow-up as needed & Return in about 2 months (around 6/9/2023) for follow up (asthma, nutrition) - please make sure to get fasting blood tests before then.   all other ROS negative except as per HPI

## 2023-05-08 ENCOUNTER — APPOINTMENT (OUTPATIENT)
Dept: ENDOCRINOLOGY | Facility: CLINIC | Age: 78
End: 2023-05-08

## 2023-06-27 ENCOUNTER — APPOINTMENT (OUTPATIENT)
Dept: ENDOCRINOLOGY | Facility: CLINIC | Age: 78
End: 2023-06-27

## 2023-08-29 ENCOUNTER — APPOINTMENT (OUTPATIENT)
Dept: CARDIOLOGY | Facility: CLINIC | Age: 78
End: 2023-08-29
Payer: MEDICARE

## 2023-08-29 ENCOUNTER — NON-APPOINTMENT (OUTPATIENT)
Age: 78
End: 2023-08-29

## 2023-08-29 VITALS
WEIGHT: 122 LBS | OXYGEN SATURATION: 97 % | HEIGHT: 63 IN | DIASTOLIC BLOOD PRESSURE: 69 MMHG | BODY MASS INDEX: 21.62 KG/M2 | SYSTOLIC BLOOD PRESSURE: 115 MMHG | HEART RATE: 72 BPM

## 2023-08-29 PROCEDURE — 99205 OFFICE O/P NEW HI 60 MIN: CPT | Mod: 25

## 2023-08-29 PROCEDURE — 93000 ELECTROCARDIOGRAM COMPLETE: CPT

## 2023-09-07 ENCOUNTER — APPOINTMENT (OUTPATIENT)
Dept: ENDOCRINOLOGY | Facility: CLINIC | Age: 78
End: 2023-09-07
Payer: MEDICARE

## 2023-09-07 VITALS
WEIGHT: 123 LBS | HEART RATE: 70 BPM | OXYGEN SATURATION: 98 % | BODY MASS INDEX: 21.79 KG/M2 | HEIGHT: 63 IN | DIASTOLIC BLOOD PRESSURE: 80 MMHG | SYSTOLIC BLOOD PRESSURE: 150 MMHG

## 2023-09-07 PROCEDURE — 99215 OFFICE O/P EST HI 40 MIN: CPT | Mod: 25

## 2023-09-07 PROCEDURE — 95251 CONT GLUC MNTR ANALYSIS I&R: CPT

## 2023-09-07 RX ORDER — INSULIN LISPRO 100 [IU]/ML
100 INJECTION, SOLUTION INTRAVENOUS; SUBCUTANEOUS
Qty: 15 | Refills: 3 | Status: ACTIVE | COMMUNITY
Start: 2023-09-07 | End: 1900-01-01

## 2023-09-07 NOTE — ASSESSMENT
[FreeTextEntry1] : 1. Diabetes Mellitus  Type: 2 A1c: 10.7% 9/7/2023 Current Regimen: Lantus 8 units at bedtime, NovoLog 2 to 3 units with meals, Janumet 50/500 twice daily, Prandin 0.5 mg before meals.  Previously noted low C-peptide, and she was continued on Lantus.  A1c very elevated when poorly compliant with insulin and medications, usually when she goes to [place].  Glycemic control much improved upon return to the , as evident by saray readings over the last 2 weeks.  PLAN:  - Regimen:       Continue Lantus 8 units at bedtime.  If noting persistent fasting hypoglycemia, can decrease to 6 units at bedtime.      Continue Janumet 50/500 twice daily.      Continue NovoLog 2 to 3 units before meals depending on the size of the meal.  If noticing postprandial hyperglycemia with 3 units of NovoLog, can increase to 4 units for larger meals.      Discontinue Prandin since she has started using short acting insulin.  - BG monitoring: Continue freestyle saray.  High risk of hypoglycemia and hyperglycemia.  - Labs: Up-to-date from 8/22/2023, see HPI.  - Preventive:         Nephropathy screening: Urine ACR negative 3/7/2023        Retinopathy screening: June 2022 negative per patient        Foot exam/podiatrist: Foot exam 3/20/2023 within normal range  - Counseling: We discussed diabetes foot care, long term complications of diabetes including but not limited to neuropathy, nephropathy, retinopathy and cardiovascular disease and the benefits of good glycemic control in preventing said complications. We discussed the risks and benefits of diabetes medications and/or insulin as relevant for today, prevention and management of hypoglycemia, importance of medication compliance and blood glucose monitoring.  2. Hyperlipidemia Last LDL: 121 from 8/2023 LDL goal: <100 - Continue atorvastatin 20mg daily, LDL elevated due to poor compliance when she was in [place]  Return to clinic in 2 months with NP, 4 months with me.  Milvia Mandujano MD [facility] at [address], [address] Ph: [phone] Fax: [phone]

## 2023-09-07 NOTE — HISTORY OF PRESENT ILLNESS
[FreeTextEntry1] : CHIEF COMPLAINT: Type 2 DM   HISTORY OF PRESENTING ILLNESS: The patient is a 77 year old F being seen in the office today for evaluation of diabetes.  Also with hyperlipidemia.  Reports that she often goes to [place], where she eats a lot of rice as she cannot control what she is cooking.  She also misses medications and insulin doses while in [place], sometimes because of running out.  After restarting insulin upon returning to the [place] the last time, A1c from 3/2023 was 7.8%.  Soon after, she went to [place] and had to stay for about 4 to 5 months because of some family issues.  [place] in [place], A1c from 8/22/2023 was 12.4%.  At this time, her PCP started her on short acting insulin in addition to the basal insulin.  DIABETES HPI:  Type of Diabetes: 2 Duration of Diabetes: >20 years ago  A1c: 10.7% 9/7/2023 8/22/2023 12.4% 7.8%, 3/7/2023  Current home regimen: Lantus 8 units at bedtime, Janumet 50/500 mg twice daily, NovoLog 2 to 3 units with meals, Prandin 0.5 mg before meals.  Short acting insulin was just started around 2 weeks ago.  Diabetes complications:  Microvascular: No known neuropathy, nephropathy or retinopathy. Macrovascular: No known CAD, CVA or PAD.  Last retinopathy screening: June/2022, normal per patient Last nephropathy screening (urine ACR): Urine ACR negative 3/2023 Last foot exam/podiatry visit: Foot exam 3/20/2023  BG self monitoring: Freestyle saray CGM review 8/25/2023 to 9/7/2023 Percentage CGM active 94% Time in range 61%, high 31%, very high 8%, low or very low 0% Average glucose 165, GMI 7.3% Pattern: Postprandial hyperglycemia, fasting hypoglycemia in the 60s. She reports that she gets postprandial hyperglycemia with eating rice and a high carb load. CGM review was much different from her A1c, since she has restarted her insulin and dietary measures after returning to the [place].  Diet: Breakfast: Lentils, banana, avocado, plantain or egg Lunch: 3 tablespoons of rice, lentils, beans, fish Dinner: Smoothie made of vegetables/fruits/nuts Snacks: Biscuits or fruits Exercise: Walks daily in the summertime, minimal exercise in the wintertime.  Comorbidities:  Hyperlipidemia: Taking atorvastatin 20 mg daily  Family Hx: Father and sisters with DM   PERTINENT LABS:  8/22/2023 outside labs reviewed: , EGFR 84, TSH 3.42, A1c 12.4%

## 2023-09-29 ENCOUNTER — APPOINTMENT (OUTPATIENT)
Dept: ENDOCRINOLOGY | Facility: CLINIC | Age: 78
End: 2023-09-29

## 2023-10-02 ENCOUNTER — APPOINTMENT (OUTPATIENT)
Dept: CT IMAGING | Facility: CLINIC | Age: 78
End: 2023-10-02
Payer: MEDICARE

## 2023-10-02 ENCOUNTER — OUTPATIENT (OUTPATIENT)
Dept: OUTPATIENT SERVICES | Facility: HOSPITAL | Age: 78
LOS: 1 days | End: 2023-10-02
Payer: MEDICARE

## 2023-10-02 DIAGNOSIS — R06.09 OTHER FORMS OF DYSPNEA: ICD-10-CM

## 2023-10-02 PROCEDURE — 75574 CT ANGIO HRT W/3D IMAGE: CPT

## 2023-10-02 PROCEDURE — 75574 CT ANGIO HRT W/3D IMAGE: CPT | Mod: 26,MH

## 2023-10-03 ENCOUNTER — RESULT REVIEW (OUTPATIENT)
Age: 78
End: 2023-10-03

## 2023-10-03 ENCOUNTER — OUTPATIENT (OUTPATIENT)
Dept: OUTPATIENT SERVICES | Facility: HOSPITAL | Age: 78
LOS: 1 days | End: 2023-10-03
Payer: MEDICARE

## 2023-10-03 DIAGNOSIS — Z00.8 ENCOUNTER FOR OTHER GENERAL EXAMINATION: ICD-10-CM

## 2023-10-03 PROCEDURE — 0502T: CPT

## 2023-10-03 PROCEDURE — 0504T: CPT

## 2023-10-03 PROCEDURE — 0503T: CPT

## 2023-10-04 ENCOUNTER — APPOINTMENT (OUTPATIENT)
Dept: CARDIOLOGY | Facility: CLINIC | Age: 78
End: 2023-10-04
Payer: MEDICARE

## 2023-10-04 ENCOUNTER — NON-APPOINTMENT (OUTPATIENT)
Age: 78
End: 2023-10-04

## 2023-10-04 VITALS
SYSTOLIC BLOOD PRESSURE: 144 MMHG | HEIGHT: 63 IN | HEART RATE: 79 BPM | OXYGEN SATURATION: 98 % | WEIGHT: 122 LBS | BODY MASS INDEX: 21.62 KG/M2 | DIASTOLIC BLOOD PRESSURE: 66 MMHG

## 2023-10-04 DIAGNOSIS — R94.39 ABNORMAL RESULT OF OTHER CARDIOVASCULAR FUNCTION STUDY: ICD-10-CM

## 2023-10-04 PROCEDURE — 99215 OFFICE O/P EST HI 40 MIN: CPT

## 2023-10-04 PROCEDURE — 93000 ELECTROCARDIOGRAM COMPLETE: CPT

## 2023-10-04 RX ORDER — ROSUVASTATIN CALCIUM 40 MG/1
40 TABLET, FILM COATED ORAL
Qty: 90 | Refills: 3 | Status: ACTIVE | COMMUNITY
Start: 2023-08-29 | End: 1900-01-01

## 2023-10-06 VITALS
HEIGHT: 63 IN | DIASTOLIC BLOOD PRESSURE: 68 MMHG | OXYGEN SATURATION: 97 % | HEART RATE: 77 BPM | SYSTOLIC BLOOD PRESSURE: 159 MMHG | WEIGHT: 104.06 LBS | TEMPERATURE: 97 F | RESPIRATION RATE: 18 BRPM

## 2023-10-06 RX ORDER — ATORVASTATIN CALCIUM 80 MG/1
1 TABLET, FILM COATED ORAL
Qty: 0 | Refills: 0 | DISCHARGE

## 2023-10-06 RX ORDER — CHLORHEXIDINE GLUCONATE 213 G/1000ML
1 SOLUTION TOPICAL ONCE
Refills: 0 | Status: DISCONTINUED | OUTPATIENT
Start: 2023-10-09 | End: 2023-10-23

## 2023-10-06 NOTE — H&P ADULT - ASSESSMENT
78 y/o F, PMHx HLD, DM2 whom in light of patient's risk factors, abnormal CCTA results, and CCS class III anginal/anginal-equivalent symptoms, patient is referred to [facility] for cardiac catheterization w/ possible intervention if clinically indicated.    EKG: NSR			  ASA: 2	Mallampati class: 2   Anginal Class: 3    -No Known Allergies    -H/H = 12.0/36.4  . Pt denies BRBPR, hematuria, hematochezia, melena. Pt endorses compliance w/ home aspirin, stating last dose was 10/08/23 AM. Pt given ASA 81 mg x1 and Plavix 600 mg x 1    -BUN/Cr = 17/0.68   Euvolemic on exam. IV NS @ 250 bolus followed by 75 cc/hr x 2_ hrs started pre procedure    Sedation Plan:   Moderate  Patient Is Suitable Candidate For Sedation?     Yes    Risks & benefits of procedure and alternative therapy have been explained to the patient including but not limited to: allergic reaction, bleeding with possible need for blood transfusion, infection, renal and vascular compromise, limb damage, arrhythmia, stroke, vessel dissection/perforation, myocardial infarction, and emergent CABG. Informed consent obtained at bedside and included in chart.

## 2023-10-06 NOTE — H&P ADULT - HISTORY OF PRESENT ILLNESS
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 She was referred to Dr. Victor who recommended CTA. Denies chest pain, palpitations, dizziness, LOC, N/V/D, fever/chills/sick contact, diaphoresis, orthopnea/PND, and leg swelling. 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 She was referred to Dr. Victor who recommended CTA. Denies chest pain, palpitations, dizziness, LOC, N/V/D, fever/chills/sick contact, diaphoresis, orthopnea/PND, and leg swelling. CT FFR 10/3/23: pLAD FFR + 0.81; dLAD indeterminate; LCx FFR+ (0.87). dOM indeterminate. CCTA 10/2/23: severe obstructive dz in LAD and LCx; Ag score 1038.  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 She was referred to Dr. Victor who recommended CTA. Denies ______ chest pain, palpitations, dizziness, LOC, N/V/D, fever/chills/sick contact, diaphoresis, orthopnea/PND, and leg swelling. CT FFR 10/3/23: pLAD FFR + 0.81; dLAD indeterminate; LCx FFR+ (0.87). dOM indeterminate. CCTA 10/2/23: severe obstructive dz in LAD and LCx; Ag score 1038. In light of patient's risk factors, abnormal ____ results, and CCS class ____ anginal/anginal-equivalent symptoms, patient is referred to [facility] for cardiac catheterization w/ possible intervention if clinically indicated. 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 She was referred to Dr. Victor who recommended cardiac CTA. Denies ______ chest pain, palpitations, dizziness, LOC, N/V/D, fever/chills/sick contact, diaphoresis, orthopnea/PND, and leg swelling. CCTA 10/2/23: severe obstructive plaque cannot be excluded in LAD and LCx; Agatston score 1038. FFR-CT 10/03/23 revealing 0.81 in pLAD, 0.78 in mid-distal vessel pLAD, 0.87 pLCx, 0.75 in terminal OM branch. In light of patient's risk factors, abnormal ____ results, and CCS class ____ anginal/anginal-equivalent symptoms, patient is referred to [facility] for cardiac catheterization w/ possible intervention if clinically indicated.           Cardiologist: Dr Victor  Pharmacy: [facility]   Escort: Son    78 y/o F, PMHx HLD, DM2 presented to her PCP c/o MCKENZIE making her unable to walk more than one block. In addition, pt admits to extreme fatigue with exertional activities. She was referred to Dr. Victor who recommended cardiac CTA. Denies chest pain, palpitations, dizziness, LOC, N/V/D, fever/chills/sick contact, diaphoresis, orthopnea/PND, and leg swelling. CCTA 10/2/23: severe obstructive plaque cannot be excluded in LAD and LCx; Agatston score 1038. FFR-CT 10/03/23 revealing 0.81 in pLAD, 0.78 in mid-distal vessel pLAD, 0.87 pLCx, 0.75 in terminal OM branch. In light of patient's risk factors, abnormal CCTA results, and CCS class III anginal/anginal-equivalent symptoms, patient is referred to [facility] for cardiac catheterization w/ possible intervention if clinically indicated.

## 2023-10-06 NOTE — H&P ADULT - NSICDXFAMILYHX_GEN_ALL_CORE_FT
FAMILY HISTORY:  No pertinent family history in first degree relatives     FAMILY HISTORY:  No family history of cardiac disease

## 2023-10-06 NOTE — H&P ADULT - NSHPLABSRESULTS_GEN_ALL_CORE
LABS:                          12.0   7.04  )-----------( 225      ( 09 Oct 2023 06:51 )             36.4     10-09    141  |  104  |  17  ----------------------------<  71  3.6   |  25  |  0.68    Ca    9.9      09 Oct 2023 06:51  Mg     1.6     10-09    TPro  7.9  /  Alb  4.5  /  TBili  0.3  /  DBili  x   /  AST  23  /  ALT  18  /  AlkPhos  63  10-09    LIVER FUNCTIONS - ( 09 Oct 2023 06:51 )  Alb: 4.5 g/dL / Pro: 7.9 g/dL / ALK PHOS: 63 U/L / ALT: 18 U/L / AST: 23 U/L / GGT: x           PT/INR - ( 09 Oct 2023 06:51 )   PT: 9.9 sec;   INR: 0.86          PTT - ( 09 Oct 2023 06:51 )  PTT:28.0 sec  Urinalysis Basic - ( 09 Oct 2023 06:51 )    Color: x / Appearance: x / SG: x / pH: x  Gluc: 71 mg/dL / Ketone: x  / Bili: x / Urobili: x   Blood: x / Protein: x / Nitrite: x   Leuk Esterase: x / RBC: x / WBC x   Sq Epi: x / Non Sq Epi: x / Bacteria: x

## 2023-10-09 ENCOUNTER — OUTPATIENT (OUTPATIENT)
Dept: OUTPATIENT SERVICES | Facility: HOSPITAL | Age: 78
LOS: 1 days | Discharge: HOME | End: 2023-10-09
Payer: MEDICARE

## 2023-10-09 LAB
A1C WITH ESTIMATED AVERAGE GLUCOSE RESULT: 10 % — HIGH (ref 4–5.6)
ALBUMIN SERPL ELPH-MCNC: 4.5 G/DL — SIGNIFICANT CHANGE UP (ref 3.3–5)
ALP SERPL-CCNC: 63 U/L — SIGNIFICANT CHANGE UP (ref 40–120)
ALT FLD-CCNC: 18 U/L — SIGNIFICANT CHANGE UP (ref 10–45)
ANION GAP SERPL CALC-SCNC: 12 MMOL/L — SIGNIFICANT CHANGE UP (ref 5–17)
ANION GAP SERPL CALC-SCNC: 9 MMOL/L — SIGNIFICANT CHANGE UP (ref 5–17)
APTT BLD: 28 SEC — SIGNIFICANT CHANGE UP (ref 24.5–35.6)
AST SERPL-CCNC: 23 U/L — SIGNIFICANT CHANGE UP (ref 10–40)
BASOPHILS # BLD AUTO: 0.05 K/UL — SIGNIFICANT CHANGE UP (ref 0–0.2)
BASOPHILS NFR BLD AUTO: 0.7 % — SIGNIFICANT CHANGE UP (ref 0–2)
BILIRUB SERPL-MCNC: 0.3 MG/DL — SIGNIFICANT CHANGE UP (ref 0.2–1.2)
BUN SERPL-MCNC: 13 MG/DL — SIGNIFICANT CHANGE UP (ref 7–23)
BUN SERPL-MCNC: 17 MG/DL — SIGNIFICANT CHANGE UP (ref 7–23)
CALCIUM SERPL-MCNC: 8.5 MG/DL — SIGNIFICANT CHANGE UP (ref 8.4–10.5)
CALCIUM SERPL-MCNC: 9.9 MG/DL — SIGNIFICANT CHANGE UP (ref 8.4–10.5)
CHLORIDE SERPL-SCNC: 103 MMOL/L — SIGNIFICANT CHANGE UP (ref 96–108)
CHLORIDE SERPL-SCNC: 104 MMOL/L — SIGNIFICANT CHANGE UP (ref 96–108)
CHOLEST SERPL-MCNC: 197 MG/DL — SIGNIFICANT CHANGE UP
CK MB CFR SERPL CALC: 2.2 NG/ML — SIGNIFICANT CHANGE UP (ref 0–6.7)
CK SERPL-CCNC: 71 U/L — SIGNIFICANT CHANGE UP (ref 25–170)
CK SERPL-CCNC: 73 U/L — SIGNIFICANT CHANGE UP (ref 25–170)
CO2 SERPL-SCNC: 25 MMOL/L — SIGNIFICANT CHANGE UP (ref 22–31)
CO2 SERPL-SCNC: 25 MMOL/L — SIGNIFICANT CHANGE UP (ref 22–31)
CREAT SERPL-MCNC: 0.67 MG/DL — SIGNIFICANT CHANGE UP (ref 0.5–1.3)
CREAT SERPL-MCNC: 0.68 MG/DL — SIGNIFICANT CHANGE UP (ref 0.5–1.3)
EGFR: 91 ML/MIN/1.73M2 — SIGNIFICANT CHANGE UP
EGFR: 91 ML/MIN/1.73M2 — SIGNIFICANT CHANGE UP
EOSINOPHIL # BLD AUTO: 0.2 K/UL — SIGNIFICANT CHANGE UP (ref 0–0.5)
EOSINOPHIL NFR BLD AUTO: 2.8 % — SIGNIFICANT CHANGE UP (ref 0–6)
ESTIMATED AVERAGE GLUCOSE: 240 MG/DL — HIGH (ref 68–114)
GLUCOSE BLDC GLUCOMTR-MCNC: 168 MG/DL — HIGH (ref 70–99)
GLUCOSE BLDC GLUCOMTR-MCNC: 74 MG/DL — SIGNIFICANT CHANGE UP (ref 70–99)
GLUCOSE SERPL-MCNC: 220 MG/DL — HIGH (ref 70–99)
GLUCOSE SERPL-MCNC: 71 MG/DL — SIGNIFICANT CHANGE UP (ref 70–99)
HCT VFR BLD CALC: 30 % — LOW (ref 34.5–45)
HCT VFR BLD CALC: 36.4 % — SIGNIFICANT CHANGE UP (ref 34.5–45)
HDLC SERPL-MCNC: 87 MG/DL — SIGNIFICANT CHANGE UP
HGB BLD-MCNC: 12 G/DL — SIGNIFICANT CHANGE UP (ref 11.5–15.5)
HGB BLD-MCNC: 9.9 G/DL — LOW (ref 11.5–15.5)
IMM GRANULOCYTES NFR BLD AUTO: 0.3 % — SIGNIFICANT CHANGE UP (ref 0–0.9)
INR BLD: 0.86 — SIGNIFICANT CHANGE UP (ref 0.85–1.18)
LIPID PNL WITH DIRECT LDL SERPL: 97 MG/DL — SIGNIFICANT CHANGE UP
LYMPHOCYTES # BLD AUTO: 2.51 K/UL — SIGNIFICANT CHANGE UP (ref 1–3.3)
LYMPHOCYTES # BLD AUTO: 35.7 % — SIGNIFICANT CHANGE UP (ref 13–44)
MAGNESIUM SERPL-MCNC: 1.6 MG/DL — SIGNIFICANT CHANGE UP (ref 1.6–2.6)
MAGNESIUM SERPL-MCNC: 2 MG/DL — SIGNIFICANT CHANGE UP (ref 1.6–2.6)
MCHC RBC-ENTMCNC: 29.9 PG — SIGNIFICANT CHANGE UP (ref 27–34)
MCHC RBC-ENTMCNC: 30.1 PG — SIGNIFICANT CHANGE UP (ref 27–34)
MCHC RBC-ENTMCNC: 33 GM/DL — SIGNIFICANT CHANGE UP (ref 32–36)
MCHC RBC-ENTMCNC: 33 GM/DL — SIGNIFICANT CHANGE UP (ref 32–36)
MCV RBC AUTO: 90.5 FL — SIGNIFICANT CHANGE UP (ref 80–100)
MCV RBC AUTO: 91.2 FL — SIGNIFICANT CHANGE UP (ref 80–100)
MONOCYTES # BLD AUTO: 0.52 K/UL — SIGNIFICANT CHANGE UP (ref 0–0.9)
MONOCYTES NFR BLD AUTO: 7.4 % — SIGNIFICANT CHANGE UP (ref 2–14)
NEUTROPHILS # BLD AUTO: 3.74 K/UL — SIGNIFICANT CHANGE UP (ref 1.8–7.4)
NEUTROPHILS NFR BLD AUTO: 53.1 % — SIGNIFICANT CHANGE UP (ref 43–77)
NON HDL CHOLESTEROL: 110 MG/DL — SIGNIFICANT CHANGE UP
NRBC # BLD: 0 /100 WBCS — SIGNIFICANT CHANGE UP (ref 0–0)
NRBC # BLD: 0 /100 WBCS — SIGNIFICANT CHANGE UP (ref 0–0)
PLATELET # BLD AUTO: 186 K/UL — SIGNIFICANT CHANGE UP (ref 150–400)
PLATELET # BLD AUTO: 225 K/UL — SIGNIFICANT CHANGE UP (ref 150–400)
POTASSIUM SERPL-MCNC: 3.6 MMOL/L — SIGNIFICANT CHANGE UP (ref 3.5–5.3)
POTASSIUM SERPL-MCNC: 4.5 MMOL/L — SIGNIFICANT CHANGE UP (ref 3.5–5.3)
POTASSIUM SERPL-SCNC: 3.6 MMOL/L — SIGNIFICANT CHANGE UP (ref 3.5–5.3)
POTASSIUM SERPL-SCNC: 4.5 MMOL/L — SIGNIFICANT CHANGE UP (ref 3.5–5.3)
PROT SERPL-MCNC: 7.9 G/DL — SIGNIFICANT CHANGE UP (ref 6–8.3)
PROTHROM AB SERPL-ACNC: 9.9 SEC — SIGNIFICANT CHANGE UP (ref 9.5–13)
RBC # BLD: 3.29 M/UL — LOW (ref 3.8–5.2)
RBC # BLD: 4.02 M/UL — SIGNIFICANT CHANGE UP (ref 3.8–5.2)
RBC # FLD: 12.7 % — SIGNIFICANT CHANGE UP (ref 10.3–14.5)
RBC # FLD: 12.8 % — SIGNIFICANT CHANGE UP (ref 10.3–14.5)
SODIUM SERPL-SCNC: 137 MMOL/L — SIGNIFICANT CHANGE UP (ref 135–145)
SODIUM SERPL-SCNC: 141 MMOL/L — SIGNIFICANT CHANGE UP (ref 135–145)
TRIGL SERPL-MCNC: 67 MG/DL — SIGNIFICANT CHANGE UP
WBC # BLD: 5.64 K/UL — SIGNIFICANT CHANGE UP (ref 3.8–10.5)
WBC # BLD: 7.04 K/UL — SIGNIFICANT CHANGE UP (ref 3.8–10.5)
WBC # FLD AUTO: 5.64 K/UL — SIGNIFICANT CHANGE UP (ref 3.8–10.5)
WBC # FLD AUTO: 7.04 K/UL — SIGNIFICANT CHANGE UP (ref 3.8–10.5)

## 2023-10-09 PROCEDURE — 80053 COMPREHEN METABOLIC PANEL: CPT

## 2023-10-09 PROCEDURE — 93010 ELECTROCARDIOGRAM REPORT: CPT

## 2023-10-09 PROCEDURE — 85025 COMPLETE CBC W/AUTO DIFF WBC: CPT

## 2023-10-09 PROCEDURE — 99152 MOD SED SAME PHYS/QHP 5/>YRS: CPT

## 2023-10-09 PROCEDURE — 82962 GLUCOSE BLOOD TEST: CPT

## 2023-10-09 PROCEDURE — 80048 BASIC METABOLIC PNL TOTAL CA: CPT

## 2023-10-09 PROCEDURE — 80061 LIPID PANEL: CPT

## 2023-10-09 PROCEDURE — 36415 COLL VENOUS BLD VENIPUNCTURE: CPT

## 2023-10-09 PROCEDURE — C1769: CPT

## 2023-10-09 PROCEDURE — C1724: CPT

## 2023-10-09 PROCEDURE — C1894: CPT

## 2023-10-09 PROCEDURE — 83735 ASSAY OF MAGNESIUM: CPT

## 2023-10-09 PROCEDURE — C1874: CPT

## 2023-10-09 PROCEDURE — 85027 COMPLETE CBC AUTOMATED: CPT

## 2023-10-09 PROCEDURE — C1887: CPT

## 2023-10-09 PROCEDURE — 92933 PRQ TRLML C ATHRC ST ANGIOP1: CPT | Mod: LD

## 2023-10-09 PROCEDURE — C9602: CPT | Mod: LD

## 2023-10-09 PROCEDURE — 85610 PROTHROMBIN TIME: CPT

## 2023-10-09 PROCEDURE — 83036 HEMOGLOBIN GLYCOSYLATED A1C: CPT

## 2023-10-09 PROCEDURE — C1725: CPT

## 2023-10-09 PROCEDURE — 82550 ASSAY OF CK (CPK): CPT

## 2023-10-09 PROCEDURE — 93458 L HRT ARTERY/VENTRICLE ANGIO: CPT | Mod: 59

## 2023-10-09 PROCEDURE — 93005 ELECTROCARDIOGRAM TRACING: CPT

## 2023-10-09 PROCEDURE — 85730 THROMBOPLASTIN TIME PARTIAL: CPT

## 2023-10-09 PROCEDURE — 93458 L HRT ARTERY/VENTRICLE ANGIO: CPT | Mod: 26,59

## 2023-10-09 PROCEDURE — 82553 CREATINE MB FRACTION: CPT

## 2023-10-09 RX ORDER — CLOPIDOGREL BISULFATE 75 MG/1
600 TABLET, FILM COATED ORAL ONCE
Refills: 0 | Status: COMPLETED | OUTPATIENT
Start: 2023-10-09 | End: 2023-10-09

## 2023-10-09 RX ORDER — GLUCAGON INJECTION, SOLUTION 0.5 MG/.1ML
1 INJECTION, SOLUTION SUBCUTANEOUS ONCE
Refills: 0 | Status: DISCONTINUED | OUTPATIENT
Start: 2023-10-09 | End: 2023-10-23

## 2023-10-09 RX ORDER — REPAGLINIDE 1 MG/1
1 TABLET ORAL
Refills: 0 | DISCHARGE

## 2023-10-09 RX ORDER — ROSUVASTATIN CALCIUM 5 MG/1
1 TABLET ORAL
Refills: 0 | DISCHARGE

## 2023-10-09 RX ORDER — ACETAMINOPHEN 500 MG
650 TABLET ORAL ONCE
Refills: 0 | Status: COMPLETED | OUTPATIENT
Start: 2023-10-09 | End: 2023-10-09

## 2023-10-09 RX ORDER — METFORMIN HYDROCHLORIDE 850 MG/1
1 TABLET ORAL
Refills: 0 | DISCHARGE

## 2023-10-09 RX ORDER — CLOPIDOGREL BISULFATE 75 MG/1
1 TABLET, FILM COATED ORAL
Qty: 30 | Refills: 11
Start: 2023-10-09 | End: 2024-10-02

## 2023-10-09 RX ORDER — INSULIN LISPRO 100/ML
VIAL (ML) SUBCUTANEOUS
Refills: 0 | Status: DISCONTINUED | OUTPATIENT
Start: 2023-10-09 | End: 2023-10-23

## 2023-10-09 RX ORDER — POTASSIUM CHLORIDE 20 MEQ
40 PACKET (EA) ORAL ONCE
Refills: 0 | Status: COMPLETED | OUTPATIENT
Start: 2023-10-09 | End: 2023-10-09

## 2023-10-09 RX ORDER — SODIUM CHLORIDE 9 MG/ML
1000 INJECTION, SOLUTION INTRAVENOUS
Refills: 0 | Status: DISCONTINUED | OUTPATIENT
Start: 2023-10-09 | End: 2023-10-23

## 2023-10-09 RX ORDER — ASPIRIN/CALCIUM CARB/MAGNESIUM 324 MG
1 TABLET ORAL
Qty: 30 | Refills: 11
Start: 2023-10-09 | End: 2024-10-02

## 2023-10-09 RX ORDER — SODIUM CHLORIDE 9 MG/ML
1000 INJECTION INTRAMUSCULAR; INTRAVENOUS; SUBCUTANEOUS
Refills: 0 | Status: DISCONTINUED | OUTPATIENT
Start: 2023-10-09 | End: 2023-10-09

## 2023-10-09 RX ORDER — MAGNESIUM SULFATE 500 MG/ML
2 VIAL (ML) INJECTION ONCE
Refills: 0 | Status: COMPLETED | OUTPATIENT
Start: 2023-10-09 | End: 2023-10-09

## 2023-10-09 RX ORDER — ASPIRIN/CALCIUM CARB/MAGNESIUM 324 MG
81 TABLET ORAL ONCE
Refills: 0 | Status: COMPLETED | OUTPATIENT
Start: 2023-10-09 | End: 2023-10-09

## 2023-10-09 RX ORDER — SODIUM CHLORIDE 9 MG/ML
500 INJECTION INTRAMUSCULAR; INTRAVENOUS; SUBCUTANEOUS
Refills: 0 | Status: DISCONTINUED | OUTPATIENT
Start: 2023-10-09 | End: 2023-10-23

## 2023-10-09 RX ORDER — INSULIN GLARGINE 100 [IU]/ML
10 INJECTION, SOLUTION SUBCUTANEOUS
Qty: 0 | Refills: 0 | DISCHARGE

## 2023-10-09 RX ORDER — SITAGLIPTIN AND METFORMIN HYDROCHLORIDE 500; 50 MG/1; MG/1
1 TABLET, FILM COATED ORAL
Qty: 0 | Refills: 0 | DISCHARGE

## 2023-10-09 RX ORDER — DEXTROSE 50 % IN WATER 50 %
25 SYRINGE (ML) INTRAVENOUS ONCE
Refills: 0 | Status: DISCONTINUED | OUTPATIENT
Start: 2023-10-09 | End: 2023-10-23

## 2023-10-09 RX ORDER — SITAGLIPTIN 50 MG/1
1 TABLET, FILM COATED ORAL
Refills: 0 | DISCHARGE

## 2023-10-09 RX ORDER — DEXTROSE 50 % IN WATER 50 %
12.5 SYRINGE (ML) INTRAVENOUS ONCE
Refills: 0 | Status: DISCONTINUED | OUTPATIENT
Start: 2023-10-09 | End: 2023-10-23

## 2023-10-09 RX ORDER — SODIUM CHLORIDE 9 MG/ML
250 INJECTION INTRAMUSCULAR; INTRAVENOUS; SUBCUTANEOUS ONCE
Refills: 0 | Status: COMPLETED | OUTPATIENT
Start: 2023-10-09 | End: 2023-10-09

## 2023-10-09 RX ORDER — DEXTROSE 50 % IN WATER 50 %
15 SYRINGE (ML) INTRAVENOUS ONCE
Refills: 0 | Status: DISCONTINUED | OUTPATIENT
Start: 2023-10-09 | End: 2023-10-23

## 2023-10-09 RX ORDER — INSULIN LISPRO 100/ML
3 VIAL (ML) SUBCUTANEOUS
Refills: 0 | DISCHARGE

## 2023-10-09 RX ORDER — INSULIN GLARGINE 100 [IU]/ML
6 INJECTION, SOLUTION SUBCUTANEOUS
Refills: 0 | DISCHARGE

## 2023-10-09 RX ADMIN — SODIUM CHLORIDE 145 MILLILITER(S): 9 INJECTION INTRAMUSCULAR; INTRAVENOUS; SUBCUTANEOUS at 12:33

## 2023-10-09 RX ADMIN — Medication 650 MILLIGRAM(S): at 16:21

## 2023-10-09 RX ADMIN — CLOPIDOGREL BISULFATE 600 MILLIGRAM(S): 75 TABLET, FILM COATED ORAL at 08:05

## 2023-10-09 RX ADMIN — Medication 81 MILLIGRAM(S): at 08:04

## 2023-10-09 RX ADMIN — Medication 40 MILLIEQUIVALENT(S): at 08:05

## 2023-10-09 RX ADMIN — SODIUM CHLORIDE 75 MILLILITER(S): 9 INJECTION INTRAMUSCULAR; INTRAVENOUS; SUBCUTANEOUS at 08:05

## 2023-10-09 RX ADMIN — SODIUM CHLORIDE 1000 MILLILITER(S): 9 INJECTION INTRAMUSCULAR; INTRAVENOUS; SUBCUTANEOUS at 08:05

## 2023-10-09 RX ADMIN — Medication 25 GRAM(S): at 08:05

## 2023-10-09 NOTE — PROGRESS NOTE ADULT - SUBJECTIVE AND OBJECTIVE BOX
Interventional Cardiology PA Post PCI SDA Discharge Note      Patient without complaints. Ambulated and voided without difficulties    Afebrile, VSS    Ext:    Right Radial:no  hematoma,no bleeding, dressing; C/D/I      Pulses:    intact RAD to baseline     A/P:  78 y/o F, PMHx HLD, DM2 whom in light of patient's risk factors, abnormal CCTA results, and CCS class III anginal/anginal-equivalent symptoms, patient is referred to [facility] for cardiac catheterization w/ possible intervention if clinically indicated.    s/p cardiac cath 10/9/23: mRCA 30-50%, moderate size; oLM 30%; ANA LUISA x1 pLAD 80-90%, distal vessel large; pLCx 50-60%; EDP 17    1.  Follow-up with PMD/Cardiologist Dr. Victor in 72 hours.  2.  Post procedure labs/EKG reviewed and stable.    3.  Pt given instructions on importance of taking antiplatelet medication.    4.  Stable for discharge as per attending Dr. Sheffield after bed rest, pt voids, wrist stable and 30 minutes of ambulation.  5.  Prescriptions for Aspirin/Plavix/Brilinta/Effient e-prescribed and submitted to patient's pharmacy.    6.  Patient will continue rosuvastatin 40  7.  Discharge forms signed and copies in chart

## 2023-10-10 PROBLEM — E78.5 HYPERLIPIDEMIA, UNSPECIFIED: Chronic | Status: ACTIVE | Noted: 2023-10-06

## 2023-10-11 ENCOUNTER — APPOINTMENT (OUTPATIENT)
Dept: CARDIOLOGY | Facility: CLINIC | Age: 78
End: 2023-10-11
Payer: MEDICARE

## 2023-10-11 ENCOUNTER — NON-APPOINTMENT (OUTPATIENT)
Age: 78
End: 2023-10-11

## 2023-10-11 VITALS
SYSTOLIC BLOOD PRESSURE: 135 MMHG | OXYGEN SATURATION: 98 % | BODY MASS INDEX: 21.61 KG/M2 | HEART RATE: 74 BPM | WEIGHT: 122 LBS | DIASTOLIC BLOOD PRESSURE: 76 MMHG

## 2023-10-11 PROCEDURE — 99215 OFFICE O/P EST HI 40 MIN: CPT

## 2023-10-11 PROCEDURE — 93306 TTE W/DOPPLER COMPLETE: CPT

## 2023-10-11 PROCEDURE — 93000 ELECTROCARDIOGRAM COMPLETE: CPT

## 2023-10-12 DIAGNOSIS — I25.110 ATHEROSCLEROTIC HEART DISEASE OF NATIVE CORONARY ARTERY WITH UNSTABLE ANGINA PECTORIS: ICD-10-CM

## 2023-10-26 DIAGNOSIS — R06.09 OTHER FORMS OF DYSPNEA: ICD-10-CM

## 2023-10-30 ENCOUNTER — APPOINTMENT (OUTPATIENT)
Dept: CARDIOLOGY | Facility: CLINIC | Age: 78
End: 2023-10-30

## 2023-11-01 ENCOUNTER — APPOINTMENT (OUTPATIENT)
Dept: ENDOCRINOLOGY | Facility: CLINIC | Age: 78
End: 2023-11-01
Payer: MEDICARE

## 2023-11-01 PROCEDURE — G0108 DIAB MANAGE TRN  PER INDIV: CPT

## 2023-11-14 ENCOUNTER — NON-APPOINTMENT (OUTPATIENT)
Age: 78
End: 2023-11-14

## 2023-11-14 ENCOUNTER — APPOINTMENT (OUTPATIENT)
Dept: CARDIOLOGY | Facility: CLINIC | Age: 78
End: 2023-11-14

## 2023-11-17 ENCOUNTER — APPOINTMENT (OUTPATIENT)
Dept: ENDOCRINOLOGY | Facility: CLINIC | Age: 78
End: 2023-11-17
Payer: MEDICARE

## 2023-11-17 VITALS
HEART RATE: 86 BPM | SYSTOLIC BLOOD PRESSURE: 106 MMHG | BODY MASS INDEX: 21.62 KG/M2 | OXYGEN SATURATION: 99 % | WEIGHT: 122 LBS | DIASTOLIC BLOOD PRESSURE: 70 MMHG | HEIGHT: 63 IN

## 2023-11-17 LAB — HBA1C MFR BLD HPLC: 8.3

## 2023-11-17 PROCEDURE — 83036 HEMOGLOBIN GLYCOSYLATED A1C: CPT | Mod: QW

## 2023-11-17 PROCEDURE — 95251 CONT GLUC MNTR ANALYSIS I&R: CPT

## 2023-11-17 PROCEDURE — 99214 OFFICE O/P EST MOD 30 MIN: CPT | Mod: 25

## 2023-11-29 ENCOUNTER — OFFICE (OUTPATIENT)
Dept: URBAN - METROPOLITAN AREA CLINIC 34 | Facility: CLINIC | Age: 78
Setting detail: OPHTHALMOLOGY
End: 2023-11-29
Payer: MEDICARE

## 2023-11-29 VITALS — HEIGHT: 55 IN

## 2023-11-29 DIAGNOSIS — H35.40: ICD-10-CM

## 2023-11-29 DIAGNOSIS — E11.9: ICD-10-CM

## 2023-11-29 DIAGNOSIS — H40.053: ICD-10-CM

## 2023-11-29 DIAGNOSIS — Z96.1: ICD-10-CM

## 2023-11-29 DIAGNOSIS — H01.001: ICD-10-CM

## 2023-11-29 DIAGNOSIS — H01.004: ICD-10-CM

## 2023-11-29 DIAGNOSIS — H25.12: ICD-10-CM

## 2023-11-29 DIAGNOSIS — H43.811: ICD-10-CM

## 2023-11-29 DIAGNOSIS — H16.221: ICD-10-CM

## 2023-11-29 DIAGNOSIS — H26.491: ICD-10-CM

## 2023-11-29 DIAGNOSIS — H35.371: ICD-10-CM

## 2023-11-29 PROCEDURE — 99214 OFFICE O/P EST MOD 30 MIN: CPT | Performed by: OPHTHALMOLOGY

## 2023-11-29 ASSESSMENT — REFRACTION_MANIFEST
OD_CYLINDER: +2.00
OS_VA1: 20/20
OD_SPHERE: PLANO
OS_AXIS: 176
OS_CYLINDER: +0.50
OS_AXIS: 170
OD_AXIS: 180
OD_VA1: 20/25-1
OD_CYLINDER: +2.00
OD_AXIS: 180
OS_CYLINDER: +1.00
OS_AXIS: 170
OS_CYLINDER: +1.00
OD_VA1: 20/25-1
OD_CYLINDER: +1.50
OD_VA1: 20/25-1
OS_SPHERE: +1.75
OD_VA1: 20/30-2
OS_AXIS: 165
OD_AXIS: 005
OS_VA1: 20/25-
OD_SPHERE: -0.25
OS_VA1: 20/20
OD_VA1: 20/70
OD_SPHERE: +0.50
OS_SPHERE: +1.50
OD_AXIS: 040
OD_SPHERE: -0.50
OS_AXIS: 165
OD_SPHERE: -0.75
OD_CYLINDER: +1.00
OD_CYLINDER: +2.00
OS_VA1: 20/20
OD_AXIS: 040
OS_SPHERE: +1.50
OD_CYLINDER: +0.50
OD_CYLINDER: +1.25
OS_VA1: 20/20-2
OD_VA1: 20/50-
OD_AXIS: 180
OD_SPHERE: -0.25
OS_SPHERE: +1.50
OS_CYLINDER: +1.00
OD_SPHERE: +1.75
OD_VA1: 20/30
OS_CYLINDER: +1.25
OD_SPHERE: -2.00
OD_AXIS: 001
OD_CYLINDER: +1.75
OD_VA1: 20/60-2
OD_AXIS: 005
OS_SPHERE: +1.50

## 2023-11-29 ASSESSMENT — SPHEQUIV_DERIVED
OS_SPHEQUIV: 2
OS_SPHEQUIV: 2.25
OD_SPHEQUIV: 0.625
OS_SPHEQUIV: 2
OD_SPHEQUIV: 0.375
OD_SPHEQUIV: -0.125
OD_SPHEQUIV: -1
OD_SPHEQUIV: 0
OS_SPHEQUIV: 2
OS_SPHEQUIV: 2
OD_SPHEQUIV: 2
OS_SPHEQUIV: 2.125
OD_SPHEQUIV: 1.5
OD_SPHEQUIV: 0

## 2023-11-29 ASSESSMENT — REFRACTION_AUTOREFRACTION
OD_SPHERE: -0.75
OS_CYLINDER: +1.00
OD_AXIS: 001
OD_CYLINDER: +1.25
OS_AXIS: 176
OS_SPHERE: +1.75

## 2023-11-29 ASSESSMENT — REFRACTION_CURRENTRX
OD_AXIS: 008
OD_OVR_VA: 20/
OS_AXIS: 166
OD_VPRISM_DIRECTION: PROGS
OS_ADD: +2.25
OD_CYLINDER: +1.25
OS_CYLINDER: +0.50
OS_SPHERE: +2.00
OD_SPHERE: +1.75
OD_ADD: +2.25
OS_OVR_VA: 20/
OS_VPRISM_DIRECTION: PROGS

## 2023-11-29 ASSESSMENT — LID EXAM ASSESSMENTS
OD_COMMENTS: SHORTENED MEIBOMIAN GLAND
OS_COMMENTS: SHORTENED MEIBOMIAN GLAND
OS_BLEPHARITIS: LUL T
OD_BLEPHARITIS: RUL T

## 2023-11-29 ASSESSMENT — CONFRONTATIONAL VISUAL FIELD TEST (CVF)
OD_FINDINGS: FULL
OS_FINDINGS: FULL

## 2023-12-24 ENCOUNTER — EMERGENCY (EMERGENCY)
Facility: HOSPITAL | Age: 78
LOS: 1 days | Discharge: HOME | End: 2023-12-24
Attending: EMERGENCY MEDICINE | Admitting: EMERGENCY MEDICINE
Payer: MEDICARE

## 2023-12-24 VITALS
TEMPERATURE: 98 F | OXYGEN SATURATION: 99 % | WEIGHT: 123.02 LBS | HEART RATE: 81 BPM | SYSTOLIC BLOOD PRESSURE: 171 MMHG | DIASTOLIC BLOOD PRESSURE: 82 MMHG | RESPIRATION RATE: 18 BRPM | HEIGHT: 63 IN

## 2023-12-24 LAB
ALBUMIN SERPL ELPH-MCNC: 4.4 G/DL — SIGNIFICANT CHANGE UP (ref 3.3–5)
ALBUMIN SERPL ELPH-MCNC: 4.4 G/DL — SIGNIFICANT CHANGE UP (ref 3.3–5)
ALP SERPL-CCNC: 54 U/L — SIGNIFICANT CHANGE UP (ref 40–120)
ALP SERPL-CCNC: 54 U/L — SIGNIFICANT CHANGE UP (ref 40–120)
ALT FLD-CCNC: 12 U/L — SIGNIFICANT CHANGE UP (ref 10–45)
ALT FLD-CCNC: 12 U/L — SIGNIFICANT CHANGE UP (ref 10–45)
ANION GAP SERPL CALC-SCNC: 10 MMOL/L — SIGNIFICANT CHANGE UP (ref 5–17)
ANION GAP SERPL CALC-SCNC: 10 MMOL/L — SIGNIFICANT CHANGE UP (ref 5–17)
AST SERPL-CCNC: 14 U/L — SIGNIFICANT CHANGE UP (ref 10–40)
AST SERPL-CCNC: 14 U/L — SIGNIFICANT CHANGE UP (ref 10–40)
BASOPHILS # BLD AUTO: 0.03 K/UL — SIGNIFICANT CHANGE UP (ref 0–0.2)
BASOPHILS # BLD AUTO: 0.03 K/UL — SIGNIFICANT CHANGE UP (ref 0–0.2)
BASOPHILS NFR BLD AUTO: 0.4 % — SIGNIFICANT CHANGE UP (ref 0–2)
BASOPHILS NFR BLD AUTO: 0.4 % — SIGNIFICANT CHANGE UP (ref 0–2)
BILIRUB SERPL-MCNC: 0.3 MG/DL — SIGNIFICANT CHANGE UP (ref 0.2–1.2)
BILIRUB SERPL-MCNC: 0.3 MG/DL — SIGNIFICANT CHANGE UP (ref 0.2–1.2)
BUN SERPL-MCNC: 14 MG/DL — SIGNIFICANT CHANGE UP (ref 7–23)
BUN SERPL-MCNC: 14 MG/DL — SIGNIFICANT CHANGE UP (ref 7–23)
CALCIUM SERPL-MCNC: 10.2 MG/DL — SIGNIFICANT CHANGE UP (ref 8.4–10.5)
CALCIUM SERPL-MCNC: 10.2 MG/DL — SIGNIFICANT CHANGE UP (ref 8.4–10.5)
CHLORIDE SERPL-SCNC: 101 MMOL/L — SIGNIFICANT CHANGE UP (ref 96–108)
CHLORIDE SERPL-SCNC: 101 MMOL/L — SIGNIFICANT CHANGE UP (ref 96–108)
CO2 SERPL-SCNC: 26 MMOL/L — SIGNIFICANT CHANGE UP (ref 22–31)
CO2 SERPL-SCNC: 26 MMOL/L — SIGNIFICANT CHANGE UP (ref 22–31)
CREAT SERPL-MCNC: 0.82 MG/DL — SIGNIFICANT CHANGE UP (ref 0.5–1.3)
CREAT SERPL-MCNC: 0.82 MG/DL — SIGNIFICANT CHANGE UP (ref 0.5–1.3)
CRP SERPL-MCNC: 6 MG/L — HIGH (ref 0–4)
CRP SERPL-MCNC: 6 MG/L — HIGH (ref 0–4)
EGFR: 74 ML/MIN/1.73M2 — SIGNIFICANT CHANGE UP
EGFR: 74 ML/MIN/1.73M2 — SIGNIFICANT CHANGE UP
EOSINOPHIL # BLD AUTO: 0.1 K/UL — SIGNIFICANT CHANGE UP (ref 0–0.5)
EOSINOPHIL # BLD AUTO: 0.1 K/UL — SIGNIFICANT CHANGE UP (ref 0–0.5)
EOSINOPHIL NFR BLD AUTO: 1.3 % — SIGNIFICANT CHANGE UP (ref 0–6)
EOSINOPHIL NFR BLD AUTO: 1.3 % — SIGNIFICANT CHANGE UP (ref 0–6)
GLUCOSE BLDC GLUCOMTR-MCNC: 206 MG/DL — HIGH (ref 70–99)
GLUCOSE BLDC GLUCOMTR-MCNC: 206 MG/DL — HIGH (ref 70–99)
GLUCOSE SERPL-MCNC: 206 MG/DL — HIGH (ref 70–99)
GLUCOSE SERPL-MCNC: 206 MG/DL — HIGH (ref 70–99)
HCT VFR BLD CALC: 34.1 % — LOW (ref 34.5–45)
HCT VFR BLD CALC: 34.1 % — LOW (ref 34.5–45)
HGB BLD-MCNC: 11.1 G/DL — LOW (ref 11.5–15.5)
HGB BLD-MCNC: 11.1 G/DL — LOW (ref 11.5–15.5)
IMM GRANULOCYTES NFR BLD AUTO: 0.3 % — SIGNIFICANT CHANGE UP (ref 0–0.9)
IMM GRANULOCYTES NFR BLD AUTO: 0.3 % — SIGNIFICANT CHANGE UP (ref 0–0.9)
LYMPHOCYTES # BLD AUTO: 1.6 K/UL — SIGNIFICANT CHANGE UP (ref 1–3.3)
LYMPHOCYTES # BLD AUTO: 1.6 K/UL — SIGNIFICANT CHANGE UP (ref 1–3.3)
LYMPHOCYTES # BLD AUTO: 21.1 % — SIGNIFICANT CHANGE UP (ref 13–44)
LYMPHOCYTES # BLD AUTO: 21.1 % — SIGNIFICANT CHANGE UP (ref 13–44)
MCHC RBC-ENTMCNC: 30.1 PG — SIGNIFICANT CHANGE UP (ref 27–34)
MCHC RBC-ENTMCNC: 30.1 PG — SIGNIFICANT CHANGE UP (ref 27–34)
MCHC RBC-ENTMCNC: 32.6 GM/DL — SIGNIFICANT CHANGE UP (ref 32–36)
MCHC RBC-ENTMCNC: 32.6 GM/DL — SIGNIFICANT CHANGE UP (ref 32–36)
MCV RBC AUTO: 92.4 FL — SIGNIFICANT CHANGE UP (ref 80–100)
MCV RBC AUTO: 92.4 FL — SIGNIFICANT CHANGE UP (ref 80–100)
MONOCYTES # BLD AUTO: 0.62 K/UL — SIGNIFICANT CHANGE UP (ref 0–0.9)
MONOCYTES # BLD AUTO: 0.62 K/UL — SIGNIFICANT CHANGE UP (ref 0–0.9)
MONOCYTES NFR BLD AUTO: 8.2 % — SIGNIFICANT CHANGE UP (ref 2–14)
MONOCYTES NFR BLD AUTO: 8.2 % — SIGNIFICANT CHANGE UP (ref 2–14)
NEUTROPHILS # BLD AUTO: 5.21 K/UL — SIGNIFICANT CHANGE UP (ref 1.8–7.4)
NEUTROPHILS # BLD AUTO: 5.21 K/UL — SIGNIFICANT CHANGE UP (ref 1.8–7.4)
NEUTROPHILS NFR BLD AUTO: 68.7 % — SIGNIFICANT CHANGE UP (ref 43–77)
NEUTROPHILS NFR BLD AUTO: 68.7 % — SIGNIFICANT CHANGE UP (ref 43–77)
NRBC # BLD: 0 /100 WBCS — SIGNIFICANT CHANGE UP (ref 0–0)
NRBC # BLD: 0 /100 WBCS — SIGNIFICANT CHANGE UP (ref 0–0)
PLATELET # BLD AUTO: 238 K/UL — SIGNIFICANT CHANGE UP (ref 150–400)
PLATELET # BLD AUTO: 238 K/UL — SIGNIFICANT CHANGE UP (ref 150–400)
POTASSIUM SERPL-MCNC: 4.2 MMOL/L — SIGNIFICANT CHANGE UP (ref 3.5–5.3)
POTASSIUM SERPL-MCNC: 4.2 MMOL/L — SIGNIFICANT CHANGE UP (ref 3.5–5.3)
POTASSIUM SERPL-SCNC: 4.2 MMOL/L — SIGNIFICANT CHANGE UP (ref 3.5–5.3)
POTASSIUM SERPL-SCNC: 4.2 MMOL/L — SIGNIFICANT CHANGE UP (ref 3.5–5.3)
PROT SERPL-MCNC: 7.8 G/DL — SIGNIFICANT CHANGE UP (ref 6–8.3)
PROT SERPL-MCNC: 7.8 G/DL — SIGNIFICANT CHANGE UP (ref 6–8.3)
RBC # BLD: 3.69 M/UL — LOW (ref 3.8–5.2)
RBC # BLD: 3.69 M/UL — LOW (ref 3.8–5.2)
RBC # FLD: 12.8 % — SIGNIFICANT CHANGE UP (ref 10.3–14.5)
RBC # FLD: 12.8 % — SIGNIFICANT CHANGE UP (ref 10.3–14.5)
SODIUM SERPL-SCNC: 137 MMOL/L — SIGNIFICANT CHANGE UP (ref 135–145)
SODIUM SERPL-SCNC: 137 MMOL/L — SIGNIFICANT CHANGE UP (ref 135–145)
WBC # BLD: 7.58 K/UL — SIGNIFICANT CHANGE UP (ref 3.8–10.5)
WBC # BLD: 7.58 K/UL — SIGNIFICANT CHANGE UP (ref 3.8–10.5)
WBC # FLD AUTO: 7.58 K/UL — SIGNIFICANT CHANGE UP (ref 3.8–10.5)
WBC # FLD AUTO: 7.58 K/UL — SIGNIFICANT CHANGE UP (ref 3.8–10.5)

## 2023-12-24 PROCEDURE — 73120 X-RAY EXAM OF HAND: CPT | Mod: 26,LT

## 2023-12-24 PROCEDURE — 99223 1ST HOSP IP/OBS HIGH 75: CPT | Mod: FS

## 2023-12-24 PROCEDURE — 73110 X-RAY EXAM OF WRIST: CPT | Mod: 26,LT

## 2023-12-24 RX ORDER — DEXTROSE 50 % IN WATER 50 %
15 SYRINGE (ML) INTRAVENOUS ONCE
Refills: 0 | Status: DISCONTINUED | OUTPATIENT
Start: 2023-12-24 | End: 2023-12-28

## 2023-12-24 RX ORDER — SODIUM CHLORIDE 9 MG/ML
1000 INJECTION, SOLUTION INTRAVENOUS
Refills: 0 | Status: DISCONTINUED | OUTPATIENT
Start: 2023-12-24 | End: 2023-12-28

## 2023-12-24 RX ORDER — CLOPIDOGREL BISULFATE 75 MG/1
75 TABLET, FILM COATED ORAL DAILY
Refills: 0 | Status: DISCONTINUED | OUTPATIENT
Start: 2023-12-24 | End: 2023-12-28

## 2023-12-24 RX ORDER — AMPICILLIN SODIUM AND SULBACTAM SODIUM 250; 125 MG/ML; MG/ML
3 INJECTION, POWDER, FOR SUSPENSION INTRAMUSCULAR; INTRAVENOUS ONCE
Refills: 0 | Status: COMPLETED | OUTPATIENT
Start: 2023-12-24 | End: 2023-12-24

## 2023-12-24 RX ORDER — AMPICILLIN SODIUM AND SULBACTAM SODIUM 250; 125 MG/ML; MG/ML
3 INJECTION, POWDER, FOR SUSPENSION INTRAMUSCULAR; INTRAVENOUS EVERY 6 HOURS
Refills: 0 | Status: DISCONTINUED | OUTPATIENT
Start: 2023-12-24 | End: 2023-12-28

## 2023-12-24 RX ORDER — DEXTROSE 50 % IN WATER 50 %
25 SYRINGE (ML) INTRAVENOUS ONCE
Refills: 0 | Status: DISCONTINUED | OUTPATIENT
Start: 2023-12-24 | End: 2023-12-28

## 2023-12-24 RX ORDER — INSULIN GLARGINE 100 [IU]/ML
6 INJECTION, SOLUTION SUBCUTANEOUS AT BEDTIME
Refills: 0 | Status: DISCONTINUED | OUTPATIENT
Start: 2023-12-24 | End: 2023-12-28

## 2023-12-24 RX ORDER — ATORVASTATIN CALCIUM 80 MG/1
80 TABLET, FILM COATED ORAL AT BEDTIME
Refills: 0 | Status: DISCONTINUED | OUTPATIENT
Start: 2023-12-24 | End: 2023-12-24

## 2023-12-24 RX ORDER — GLUCAGON INJECTION, SOLUTION 0.5 MG/.1ML
1 INJECTION, SOLUTION SUBCUTANEOUS ONCE
Refills: 0 | Status: DISCONTINUED | OUTPATIENT
Start: 2023-12-24 | End: 2023-12-28

## 2023-12-24 RX ORDER — ACETAMINOPHEN 500 MG
975 TABLET ORAL EVERY 6 HOURS
Refills: 0 | Status: DISCONTINUED | OUTPATIENT
Start: 2023-12-24 | End: 2023-12-28

## 2023-12-24 RX ORDER — ATORVASTATIN CALCIUM 80 MG/1
40 TABLET, FILM COATED ORAL AT BEDTIME
Refills: 0 | Status: DISCONTINUED | OUTPATIENT
Start: 2023-12-24 | End: 2023-12-28

## 2023-12-24 RX ORDER — DEXTROSE 50 % IN WATER 50 %
12.5 SYRINGE (ML) INTRAVENOUS ONCE
Refills: 0 | Status: DISCONTINUED | OUTPATIENT
Start: 2023-12-24 | End: 2023-12-28

## 2023-12-24 RX ORDER — ASPIRIN/CALCIUM CARB/MAGNESIUM 324 MG
81 TABLET ORAL DAILY
Refills: 0 | Status: DISCONTINUED | OUTPATIENT
Start: 2023-12-24 | End: 2023-12-28

## 2023-12-24 RX ORDER — INSULIN LISPRO 100/ML
VIAL (ML) SUBCUTANEOUS
Refills: 0 | Status: DISCONTINUED | OUTPATIENT
Start: 2023-12-24 | End: 2023-12-28

## 2023-12-24 RX ADMIN — ATORVASTATIN CALCIUM 40 MILLIGRAM(S): 80 TABLET, FILM COATED ORAL at 22:10

## 2023-12-24 RX ADMIN — INSULIN GLARGINE 6 UNIT(S): 100 INJECTION, SOLUTION SUBCUTANEOUS at 21:34

## 2023-12-24 RX ADMIN — AMPICILLIN SODIUM AND SULBACTAM SODIUM 200 GRAM(S): 250; 125 INJECTION, POWDER, FOR SUSPENSION INTRAMUSCULAR; INTRAVENOUS at 21:34

## 2023-12-24 RX ADMIN — AMPICILLIN SODIUM AND SULBACTAM SODIUM 200 GRAM(S): 250; 125 INJECTION, POWDER, FOR SUSPENSION INTRAMUSCULAR; INTRAVENOUS at 16:14

## 2023-12-24 RX ADMIN — AMPICILLIN SODIUM AND SULBACTAM SODIUM 3 GRAM(S): 250; 125 INJECTION, POWDER, FOR SUSPENSION INTRAMUSCULAR; INTRAVENOUS at 18:23

## 2023-12-24 NOTE — CONSULT NOTE ADULT - SUBJECTIVE AND OBJECTIVE BOX
HAROON PEPE    [id number]    History:  78yFemale PMH R index finger cellulitis in 2019 managed by Dr. Evans with soaks and IV abx presents to ED with complaint of L hand pain and swelling. This began yesterday. Denies trauma or associated injury but does recall few days ago nicking her L index finger with a knife while cooking, though this did not draw blood. Denies nausea, vomiting, chest pain, shortness of breath, abdominal pain or fever. No new complaints. No acute motor or sensory changes are reported. Pt is R hand dominant.     No pertinent family history in first degree relatives    No family history of cardiac disease    MEWS Score    Diabetes    HLD (hyperlipidemia)    Left wrist pain    Cellulitis    No significant past surgical history    ABNORMAL FINDINGS ON DX IMAGIN    90+    SysAdmin_VisitLink        Vital Signs Last 24 Hrs  T(C): 37.7 (24 Dec 2023 20:30), Max: 37.7 (24 Dec 2023 20:30)  T(F): 99.8 (24 Dec 2023 20:30), Max: 99.8 (24 Dec 2023 20:30)  HR: 74 (24 Dec 2023 20:30) (74 - 84)  BP: 149/79 (24 Dec 2023 20:30) (149/79 - 171/82)  BP(mean): --  RR: 18 (24 Dec 2023 20:30) (18 - 20)  SpO2: 99% (24 Dec 2023 20:30) (99% - 100%)    Parameters below as of 24 Dec 2023 20:30  Patient On (Oxygen Delivery Method): room air                              11.1   7.58  )-----------( 238      ( 24 Dec 2023 16:17 )             34.1     12-24    137  |  101  |  14  ----------------------------<  206<H>  4.2   |  26  |  0.82    Ca    10.2      24 Dec 2023 16:17    TPro  7.8  /  Alb  4.4  /  TBili  0.3  /  DBili  x   /  AST  14  /  ALT  12  /  AlkPhos  54  12-24      MEDICATIONS  (STANDING):  ampicillin/sulbactam  IVPB 3 Gram(s) IV Intermittent every 6 hours  aspirin enteric coated 81 milliGRAM(s) Oral daily  atorvastatin 40 milliGRAM(s) Oral at bedtime  clopidogrel Tablet 75 milliGRAM(s) Oral daily  dextrose 5%. 1000 milliLiter(s) (50 mL/Hr) IV Continuous <Continuous>  dextrose 5%. 1000 milliLiter(s) (100 mL/Hr) IV Continuous <Continuous>  dextrose 50% Injectable 25 Gram(s) IV Push once  dextrose 50% Injectable 25 Gram(s) IV Push once  dextrose 50% Injectable 12.5 Gram(s) IV Push once  glucagon  Injectable 1 milliGRAM(s) IntraMuscular once  insulin glargine Injectable (LANTUS) 6 Unit(s) SubCutaneous at bedtime  insulin lispro (ADMELOG) corrective regimen sliding scale   SubCutaneous three times a day before meals    MEDICATIONS  (PRN):  acetaminophen     Tablet .. 975 milliGRAM(s) Oral every 6 hours PRN Temp greater or equal to 38C (100.4F), Mild Pain (1 - 3)  dextrose Oral Gel 15 Gram(s) Oral once PRN Blood Glucose LESS THAN 70 milliGRAM(s)/deciliter      Physical exam:   NAD Alert Awake, Follows commands. Nonseptic appearing.  There is erythema of the Left Hand at the base of the hand on the radial side. The skin is intact. No open skin. There is mild global tenderness of the affected area. No fluctuance. No drainage or discharge. No proximal streaking or spreading is noted. Compartments are soft. Sensation to light touch is intact of digits distally. Neurologically without focal deficit and strength is symmetric bilaterally. Motion is mildly limited as a result of pain. No focal motor weaknesses are appreciated. 2+ Radial pulse. Capillary refill brisk less than 2 seconds. No cyanosis.    Primary Orthopedic Assessment:  • Cellulitis of L hand. XR does not show evidence of osteomyelitis.     Plan:   -Continue IV antibiotics, ID Consult  -Tetanus given history of small knife lac  -Elevation of the affected extremity  -Analgesia  -DVT PE ppx  -No orthopedic surgical or procedural intervention planned at this time   HAROON PEPE    [id number]    History:  78yFemale PMH R index finger cellulitis in 2019 managed by Dr. Evans with soaks and IV abx presents to ED with complaint of L hand pain and swelling. This began yesterday. Denies trauma or associated injury but does recall few days ago nicking her L index finger with a knife while cooking, though this did not draw blood. Denies nausea, vomiting, chest pain, shortness of breath, abdominal pain or fever. No new complaints. No acute motor or sensory changes are reported. Pt is R hand dominant.     No pertinent family history in first degree relatives    No family history of cardiac disease    MEWS Score    Diabetes    HLD (hyperlipidemia)    Left wrist pain    Cellulitis    No significant past surgical history    ABNORMAL FINDINGS ON DX IMAGIN    90+    SysAdmin_VisitLink        Vital Signs Last 24 Hrs  T(C): 37.7 (24 Dec 2023 20:30), Max: 37.7 (24 Dec 2023 20:30)  T(F): 99.8 (24 Dec 2023 20:30), Max: 99.8 (24 Dec 2023 20:30)  HR: 74 (24 Dec 2023 20:30) (74 - 84)  BP: 149/79 (24 Dec 2023 20:30) (149/79 - 171/82)  BP(mean): --  RR: 18 (24 Dec 2023 20:30) (18 - 20)  SpO2: 99% (24 Dec 2023 20:30) (99% - 100%)    Parameters below as of 24 Dec 2023 20:30  Patient On (Oxygen Delivery Method): room air                              11.1   7.58  )-----------( 238      ( 24 Dec 2023 16:17 )             34.1     12-24    137  |  101  |  14  ----------------------------<  206<H>  4.2   |  26  |  0.82    Ca    10.2      24 Dec 2023 16:17    TPro  7.8  /  Alb  4.4  /  TBili  0.3  /  DBili  x   /  AST  14  /  ALT  12  /  AlkPhos  54  12-24      MEDICATIONS  (STANDING):  ampicillin/sulbactam  IVPB 3 Gram(s) IV Intermittent every 6 hours  aspirin enteric coated 81 milliGRAM(s) Oral daily  atorvastatin 40 milliGRAM(s) Oral at bedtime  clopidogrel Tablet 75 milliGRAM(s) Oral daily  dextrose 5%. 1000 milliLiter(s) (50 mL/Hr) IV Continuous <Continuous>  dextrose 5%. 1000 milliLiter(s) (100 mL/Hr) IV Continuous <Continuous>  dextrose 50% Injectable 25 Gram(s) IV Push once  dextrose 50% Injectable 25 Gram(s) IV Push once  dextrose 50% Injectable 12.5 Gram(s) IV Push once  glucagon  Injectable 1 milliGRAM(s) IntraMuscular once  insulin glargine Injectable (LANTUS) 6 Unit(s) SubCutaneous at bedtime  insulin lispro (ADMELOG) corrective regimen sliding scale   SubCutaneous three times a day before meals    MEDICATIONS  (PRN):  acetaminophen     Tablet .. 975 milliGRAM(s) Oral every 6 hours PRN Temp greater or equal to 38C (100.4F), Mild Pain (1 - 3)  dextrose Oral Gel 15 Gram(s) Oral once PRN Blood Glucose LESS THAN 70 milliGRAM(s)/deciliter      Physical exam:   NAD Alert Awake, Follows commands. Nonseptic appearing.  There is erythema of the Left Hand at the base of the hand on the radial side. The skin is intact. No open skin. There is mild global tenderness of the affected area. No fluctuance. No drainage or discharge. No proximal streaking or spreading is noted. Compartments are soft. Sensation to light touch is intact of digits distally. Neurologically without focal deficit and strength is symmetric bilaterally. Motion is mildly limited as a result of pain. No focal motor weaknesses are appreciated. 2+ Radial pulse. Capillary refill brisk less than 2 seconds. No cyanosis.    Primary Orthopedic Assessment:  • Cellulitis of L hand. XR does not show evidence of osteomyelitis.     Plan:   -Continue IV antibiotics, ID Consult  -Tetanus given history of small knife lac  -Elevation of the affected extremity  -Analgesia  -DVT PE ppx  -No orthopedic surgical or procedural intervention planned at this time

## 2023-12-24 NOTE — ED CDU PROVIDER DISPOSITION NOTE - ATTENDING APP SHARED VISIT CONTRIBUTION OF CARE
attending Darwin: 78yF h/o DM, HTN, HLD, CAD s/p stent initially presented with small laceration to her L 2nd DIP flexor surface 2 days ago, now with pain, swelling and erythema to the dorsum of the digit extending down the hand and into the wrist. In ED, labs unremarkable, CRP 6, Xray without e/o of osteomyelitis, sent to CDU for IV Abx, pain control and hand c/s.  In CDU Patient with improvement of pain and swelling of the left wrist and hand.  Able to make full fist, wrist flexion and extension strength 5 out of 5.  Patient afebrile overnight.  Spoke with orthopedics, padilla for patient to be discharged on p.o. Augmentin with outpatient follow-up this week.

## 2023-12-24 NOTE — ED ADULT NURSE NOTE - OBJECTIVE STATEMENT
79 y/o female with PMH of DM, HLD arrives to the ER complaining of hand swelling. Pt reports  having L hand swelling s/p lac 2 days ago. On assessment pt is well appearing, A&Ox4, speaking coherently, airway is patent, breathing spontaneously and unlabored. Skin is dry, warm. Pt denies any numbness or tingling. Peripheral pulses are strong and equal in bilateral extremities. Pt has equal ROM in extremities. Comfort and safety provided. 77 y/o female with PMH of DM, HLD, CAD, stents on Plavix arrives to the ER complaining of hand swelling.  Pt reports  having L hand swelling s/p lac on the second digit 2 days ago. Pt reports mild tenderness and swelling to the dorsum of the L hand. On assessment pt is well appearing, A&Ox4, speaking coherently, airway is patent, breathing spontaneously and unlabored. Skin is dry, warm. Pt denies any numbness or tingling. Peripheral pulses are strong and equal in bilateral extremities. Pt has equal ROM in extremities. Comfort and safety provided.

## 2023-12-24 NOTE — ED PROVIDER NOTE - PHYSICAL EXAMINATION
Constitutional: VS reviewed. Alert and orientedx3, well appearing, no apparent distress  HEENT: Atraumatic, EOMI  CV: RRR  Lungs: Clear and equal bilaterally, no wheezes, rales or crackles  Abdomen: Soft, nondistended, nontender  MSK: No deformities. + erythema, swelling and warmth to dorsal aspect of left wrist. No circumferential swelling. + pain with wrist extension and passive abduction. Small 0.5 cm healing lac on flexor aspect of left 2nd DIP. No TTP of flexor tendon.   Skin: Warm and dry. As visualized no rashes, lesions, bruising or erythema  Neuro: Appears nonfocal  Lymph: No pitting edema in extremities.

## 2023-12-24 NOTE — ED PROVIDER NOTE - ATTENDING CONTRIBUTION TO CARE
Patient 78-year-old female history of diabetes hypertension hyperlipidemia CAD stent presenting with small laceration to her DIP flexor surface for several days ago now there appears to be some mild tenderness with passive extension and swelling to the dorsum of the hand no obvious skin changes mild tenderness possible early tenosynovitis and history of a prior similar situation in her other hand that the son is a physician states almost led to nausea.  Patient is otherwise well-appearing vital signs stable labs x-ray antibiotics and consult possible CDU overnight for IV antibiotics.

## 2023-12-24 NOTE — ED CDU PROVIDER DISPOSITION NOTE - CARE PROVIDER_API CALL
Juana Evans  Surgery of the Hand  [address], [address]  [place] [address]  Phone: [phone]  Fax: [phone]  Follow Up Time: Urgent   Juana Evans  Surgery of the Hand  [address], [address]  [place] [address]  Phone: [phone]  Fax: [phone]  Follow Up Time: Urgent

## 2023-12-24 NOTE — ED ADULT NURSE NOTE - NSFALLHARMRISKINTERV_ED_ALL_ED
Communicate risk of Fall with Harm to all staff, patient, and family/Provide visual cue: red socks, yellow wristband, yellow gown, etc/Reinforce activity limits and safety measures with patient and family/Bed in lowest position, wheels locked, appropriate side rails in place/Call bell, personal items and telephone in reach/Instruct patient to call for assistance before getting out of bed/chair/stretcher/Non-slip footwear applied when patient is off stretcher/[place] to call system/Physically safe environment - no spills, clutter or unnecessary equipment/Purposeful Proactive Rounding/Room/bathroom lighting operational, light cord in reach Communicate risk of Fall with Harm to all staff, patient, and family/Provide visual cue: red socks, yellow wristband, yellow gown, etc/Reinforce activity limits and safety measures with patient and family/Bed in lowest position, wheels locked, appropriate side rails in place/Call bell, personal items and telephone in reach/Instruct patient to call for assistance before getting out of bed/chair/stretcher/Non-slip footwear applied when patient is off stretcher/[place] to call system/Physically safe environment - no spills, clutter or unnecessary equipment/Purposeful Proactive Rounding/Room/bathroom lighting operational, light cord in reach

## 2023-12-24 NOTE — ED CDU PROVIDER INITIAL DAY NOTE - PHYSICAL EXAMINATION
A&Ox3, NAD, well appearing  Extremities: cap refill <2, pulses in distal extremities 4+,, L index finger with small laceration to the volar aspect of the 2nd DIP flexor surface with swelling and erythema primarily to the dorsal aspect of the digit, radial aspect of the hand and the wrist. + reduced flexion/extension of the wrist and reduce  strength 2/2 discomfort.   No focal Deficits

## 2023-12-24 NOTE — ED CDU PROVIDER DISPOSITION NOTE - NSFOLLOWUPINSTRUCTIONS_ED_ALL_ED_FT
- stay hydrated.   -take all home medications as prescribed  - take tylenol 975mg every 6 hours as needed for pain-take with meals.  -take augmentin as prescribed    - follow up with your primary care provider in 1-2 days.    - follow up with _____________________.      - return if symptoms worsen, fever, weakness, worsening redness, swelling, pain, redness that tracks up the arm and all other concerns.    Cellulitis, Adult  A person's legs and feet. One leg is normal and the other leg is affected by cellulitis.  Cellulitis is a skin infection. The infected area is often warm, red, swollen, and sore. It occurs most often on the legs, feet, and toes, but can happen on any part of the body.    This condition can be life-threatening without treatment. It is very important to get treated right away.    What are the causes?  This condition is caused by bacteria. The bacteria enter through a break in the skin, such as:  A cut.  A burn.  A bug bite.  An animal bite.  An open sore.  A crack.  What increases the risk?  Having a weak body's defense system (immune system).  Being older than 60 years old.  Having a blood sugar problem (diabetes).  Having a long-term liver disease (cirrhosis) or kidney disease.  Being very overweight (obese).  Having a skin problem, such as:  An itchy rash.  A rash caused by a fungus.  A rash with blisters.  Slow movement of blood in the veins (venous stasis).  Fluid buildup below the skin (edema).  This condition is more likely to occur in people who:  Have open cuts, burns, bites, or scrapes on the skin.  Have been treated with high-energy rays (radiation).  Use IV drugs.  What are the signs or symptoms?  Skin that:  Looks red or purple, or slightly darker than your usual skin color.  Has streaks.  Has spots.  Is swollen.  Is sore or painful when you touch it.  Is warm.  A fever.  Chills.  Blisters.  Tiredness (fatigue).  How is this treated?  Medicines to treat infections or allergies.  Rest.  Placing cold or warm cloths on the skin.  Staying in the hospital, if the condition is very bad. You may need medicines through an IV.  Follow these instructions at home:  Medicines    Take over-the-counter and prescription medicines only as told by your doctor.  If you were prescribed antibiotics, take them as told by your doctor. Do not stop using them even if you start to feel better.  General instructions    Drink enough fluid to keep your pee (urine) pale yellow.  Do not touch or rub the infected area.  Raise (elevate) the infected area above the level of your heart while you are sitting or lying down.  Return to your normal activities when your doctor says that it is safe.  Place cold or warm cloths on the area as told by your doctor.  Keep all follow-up visits. Your doctor will need to make sure that a more serious infection is not developing.  Contact a doctor if:  You have a fever.  You do not start to get better after 1–2 days of treatment.  Your bone or joint under the infected area starts to hurt after the skin has healed.  Your infection comes back in the same area or another area. Signs of this may include:  You have a swollen bump in the area.  Your red area gets larger, turns dark in color, or hurts more.  You have more fluid coming from the wound.  Pus or a bad smell develops in your infected area.  You have more pain.  You feel sick and have muscle aches and weakness.  You develop vomiting or watery poop that will not go away.  Get help right away if:  You see red streaks coming from the area.  You notice the skin turns purple or black and falls off.  These symptoms may be an emergency. Get help right away. Call 911.  Do not wait to see if the symptoms will go away.  Do not drive yourself to the hospital. - stay hydrated.   -take all home medications as prescribed  - take tylenol 975mg every 6 hours as needed for pain-take with meals.  -take augmentin as prescribed    - follow up with your primary care provider in 1-2 days.    - follow up with Dr. Evans this week, call number below to make an appointment    Juana Evans  Surgery of the Hand  [address], [address]  [place] [address]  Phone: [phone]    - return if symptoms worsen, fever, weakness, worsening redness, swelling, pain, redness that tracks up the arm and all other concerns.    Cellulitis, Adult  A person's legs and feet. One leg is normal and the other leg is affected by cellulitis.  Cellulitis is a skin infection. The infected area is often warm, red, swollen, and sore. It occurs most often on the legs, feet, and toes, but can happen on any part of the body.    This condition can be life-threatening without treatment. It is very important to get treated right away.    What are the causes?  This condition is caused by bacteria. The bacteria enter through a break in the skin, such as:  A cut.  A burn.  A bug bite.  An animal bite.  An open sore.  A crack.  What increases the risk?  Having a weak body's defense system (immune system).  Being older than 60 years old.  Having a blood sugar problem (diabetes).  Having a long-term liver disease (cirrhosis) or kidney disease.  Being very overweight (obese).  Having a skin problem, such as:  An itchy rash.  A rash caused by a fungus.  A rash with blisters.  Slow movement of blood in the veins (venous stasis).  Fluid buildup below the skin (edema).  This condition is more likely to occur in people who:  Have open cuts, burns, bites, or scrapes on the skin.  Have been treated with high-energy rays (radiation).  Use IV drugs.  What are the signs or symptoms?  Skin that:  Looks red or purple, or slightly darker than your usual skin color.  Has streaks.  Has spots.  Is swollen.  Is sore or painful when you touch it.  Is warm.  A fever.  Chills.  Blisters.  Tiredness (fatigue).  How is this treated?  Medicines to treat infections or allergies.  Rest.  Placing cold or warm cloths on the skin.  Staying in the hospital, if the condition is very bad. You may need medicines through an IV.  Follow these instructions at home:  Medicines    Take over-the-counter and prescription medicines only as told by your doctor.  If you were prescribed antibiotics, take them as told by your doctor. Do not stop using them even if you start to feel better.  General instructions    Drink enough fluid to keep your pee (urine) pale yellow.  Do not touch or rub the infected area.  Raise (elevate) the infected area above the level of your heart while you are sitting or lying down.  Return to your normal activities when your doctor says that it is safe.  Place cold or warm cloths on the area as told by your doctor.  Keep all follow-up visits. Your doctor will need to make sure that a more serious infection is not developing.  Contact a doctor if:  You have a fever.  You do not start to get better after 1–2 days of treatment.  Your bone or joint under the infected area starts to hurt after the skin has healed.  Your infection comes back in the same area or another area. Signs of this may include:  You have a swollen bump in the area.  Your red area gets larger, turns dark in color, or hurts more.  You have more fluid coming from the wound.  Pus or a bad smell develops in your infected area.  You have more pain.  You feel sick and have muscle aches and weakness.  You develop vomiting or watery poop that will not go away.  Get help right away if:  You see red streaks coming from the area.  You notice the skin turns purple or black and falls off.  These symptoms may be an emergency. Get help right away. Call 911.  Do not wait to see if the symptoms will go away.  Do not drive yourself to the hospital. - stay hydrated.   -take all home medications as prescribed  - take tylenol 975mg every 6 hours as needed for pain-take with meals.  -take augmentin as prescribed    - follow up with your primary care provider in 1-2 days.    - follow up with Dr. Evans this week, call number below to make an appointment    Juana Evans  Surgery of the Hand  [address], [address]  [place] [address]  Phone: [phone]    - return if symptoms worsen, fever, weakness, worsening redness, swelling, pain, redness that tracks up the arm and all other concerns.    Cellulitis, Adult  A person's legs and feet. One leg is normal and the other leg is affected by cellulitis.  Cellulitis is a skin infection. The infected area is often warm, red, swollen, and sore. It occurs most often on the legs, feet, and toes, but can happen on any part of the body.    This condition can be life-threatening without treatment. It is very important to get treated right away.    What are the causes?  This condition is caused by bacteria. The bacteria enter through a break in the skin, such as:  A cut.  A burn.  A bug bite.  An animal bite.  An open sore.  A crack.  What increases the risk?  Having a weak body's defense system (immune system).  Being older than 60 years old.  Having a blood sugar problem (diabetes).  Having a long-term liver disease (cirrhosis) or kidney disease.  Being very overweight (obese).  Having a skin problem, such as:  An itchy rash.  A rash caused by a fungus.  A rash with blisters.  Slow movement of blood in the veins (venous stasis).  Fluid buildup below the skin (edema).  This condition is more likely to occur in people who:  Have open cuts, burns, bites, or scrapes on the skin.  Have been treated with high-energy rays (radiation).  Use IV drugs.  What are the signs or symptoms?  Skin that:  Looks red or purple, or slightly darker than your usual skin color.  Has streaks.  Has spots.  Is swollen.  Is sore or painful when you touch it.  Is warm.  A fever.  Chills.  Blisters.  Tiredness (fatigue).  How is this treated?  Medicines to treat infections or allergies.  Rest.  Placing cold or warm cloths on the skin.  Staying in the hospital, if the condition is very bad. You may need medicines through an IV.  Follow these instructions at home:  Medicines    Take over-the-counter and prescription medicines only as told by your doctor.  If you were prescribed antibiotics, take them as told by your doctor. Do not stop using them even if you start to feel better.  General instructions    Drink enough fluid to keep your pee (urine) pale yellow.  Do not touch or rub the infected area.  Raise (elevate) the infected area above the level of your heart while you are sitting or lying down.  Return to your normal activities when your doctor says that it is safe.  Place cold or warm cloths on the area as told by your doctor.  Keep all follow-up visits. Your doctor will need to make sure that a more serious infection is not developing.  Contact a doctor if:  You have a fever.  You do not start to get better after 1–2 days of treatment.  Your bone or joint under the infected area starts to hurt after the skin has healed.  Your infection comes back in the same area or another area. Signs of this may include:  You have a swollen bump in the area.  Your red area gets larger, turns dark in color, or hurts more.  You have more fluid coming from the wound.  Pus or a bad smell develops in your infected area.  You have more pain.  You feel sick and have muscle aches and weakness.  You develop vomiting or watery poop that will not go away.  Get help right away if:  You see red streaks coming from the area.  You notice the skin turns purple or black and falls off.  These symptoms may be an emergency. Get help right away. Call 911.  Do not wait to see if the symptoms will go away.  Do not drive yourself to the hospital.

## 2023-12-24 NOTE — ED CDU PROVIDER DISPOSITION NOTE - CARE PROVIDERS DIRECT ADDRESSES
[email or web address]

## 2023-12-24 NOTE — ED CDU PROVIDER DISPOSITION NOTE - PATIENT PORTAL LINK FT
You can access the FollowMyHealth Patient Portal offered by [facility] by registering at the following website: [email or web address]. By joining [employer]’s FollowMyHealth portal, you will also be able to view your health information using other applications (apps) compatible with our system. You can access the FollowMyHealth Patient Portal offered by [facility] by registering at the following website: [email or web address]. By joining [email or web address]’s FollowMyHealth portal, you will also be able to view your health information using other applications (apps) compatible with our system.

## 2023-12-24 NOTE — ED CDU PROVIDER INITIAL DAY NOTE - OBJECTIVE STATEMENT
78-year-old female history of diabetes, hypertension, hyperlipidemia CAD stent presenting with small laceration to her L 2nd DIP flexor surface 2 days ago, now with pain, swelling and erythema to the dorsum of the digit extending down the hand and into the wrist. Pt denies fever or chills, endorses similar situation on the R hand 3 years ago and ended up with osteomyelitis necessitating PICC placement and IV abx with Dr. Evans. Denies any other injury. 78-year-old female history of diabetes, hypertension, hyperlipidemia CAD stent presenting with small laceration to her L 2nd DIP flexor surface 2 days ago, now with pain, swelling and erythema to the dorsum of the digit extending down the hand and into the wrist. Pt denies fever or chills, endorses similar situation on the R hand 3 years ago and ended up with osteomyelitis necessitating PICC placement and IV abx. Denies any other injury. 78-year-old female history of diabetes, hypertension, hyperlipidemia CAD stent presenting with small laceration to her L 2nd DIP flexor surface 2 days ago, now with pain, swelling and erythema to the dorsum of the digit extending down the hand and into the wrist. Pt denies fever or chills, endorses similar situation on the R hand 3 years ago and ended up with osteomyelitis necessitating PICC placement and IV abx. Denies any other injury.    In ED, labs unremarkable, CRP 6, Xray without e/o of osteomyelitis, sent to CDU for IV Abx, pain control and hand c/s. 78-year-old female history of diabetes, hypertension, hyperlipidemia CAD stent presenting with small laceration to her L 2nd DIP flexor surface 2 days ago, now with pain, swelling and erythema to the dorsum of the digit extending down the hand and into the wrist. Pt denies fever or chills, endorses similar situation on the R hand 3 years ago and ended up with osteomyelitis necessitating PICC placement and IV abx. Denies any other injury.    In ED, labs unremarkable, CRP 6, Xray without e/o of osteomyelitis, sent to CDU for IV Abx, pain control and hand c/s. pt UTD on tdap.

## 2023-12-24 NOTE — ED CDU PROVIDER INITIAL DAY NOTE - PROGRESS NOTE DETAILS
Pt previously seen by Dr. Evans for osteo on her right hand, requesting to follow up with her again for this infection, D/w ortho, will come see pt. -Elisa Bush PA-C patient seen by orthopedics. okay with plan for abx. seen and examined at bedside. erythema noted to hand. will continue with Iv abx and monitoring.

## 2023-12-24 NOTE — ED PROVIDER NOTE - OBJECTIVE STATEMENT
78-year-old female with past medical history of HLD, DM presents emergency department for left wrist swelling.  Patient states she had a small laceration on her left index finger 2 days ago that closed on its own.  Patient states since then she has had increased swelling to the dorsal side of her left wrist.  Patient states she has had similar issue in the past with her right wrist and had a severe infection.  Patient denies any systemic symptoms.  Patient denies fevers, chills, headache, vision changes, chest pain, trouble breathing, abdominal pain, nausea/vomiting, diarrhea/constipation, dysuria, hematuria.

## 2023-12-24 NOTE — ED ADULT NURSE NOTE - ED STAT RN HANDOFF DETAILS
Patient received from the gold side. She is alert and oriented x 4. Color is good and skin warm to touch. She  is  c/o  left  hand  pain and swelling. She  is  kept  in the  observation area for  further treatment.

## 2023-12-24 NOTE — ED CDU PROVIDER DISPOSITION NOTE - CLINICAL COURSE
78-year-old female history of diabetes, hypertension, hyperlipidemia CAD stent presenting with small laceration to her L 2nd DIP flexor surface 2 days ago, now with pain, swelling and erythema to the dorsum of the digit extending down the hand and into the wrist. Pt denies fever or chills, endorses similar situation on the R hand 3 years ago and ended up with osteomyelitis necessitating PICC placement and IV abx. Denies any other injury.    In ED, labs unremarkable, CRP 6, Xray without e/o of osteomyelitis, sent to CDU for IV Abx, pain control and hand c/s. 78-year-old female history of diabetes, hypertension, hyperlipidemia CAD stent presenting with small laceration to her L 2nd DIP flexor surface 2 days ago, now with pain, swelling and erythema to the dorsum of the digit extending down the hand and into the wrist. Pt denies fever or chills, endorses similar situation on the R hand 3 years ago and ended up with osteomyelitis necessitating PICC placement and IV abx. Denies any other injury.    In ED, labs unremarkable, CRP 6, Xray without e/o of osteomyelitis, sent to CDU for IV Abx, pain control and hand c/s.     In CDU Patient reevaluated, endorses improvement of pain and swelling of the left wrist and hand.  Able to make full fist, wrist flexion and extension strength 5 out of 5.  Patient afebrile overnight.  Spoke with orthopedics, okay for patient to be discharged on p.o. Augmentin with outpatient follow-up with Dr. Evans this week.  No need for inpatient ID evaluation as per their previous chart note.  ID evaluation outpt per Dr. Whitley's recommendation if she feels appropriate at that time.

## 2023-12-25 VITALS
OXYGEN SATURATION: 98 % | RESPIRATION RATE: 18 BRPM | HEART RATE: 67 BPM | SYSTOLIC BLOOD PRESSURE: 140 MMHG | TEMPERATURE: 98 F | DIASTOLIC BLOOD PRESSURE: 59 MMHG

## 2023-12-25 LAB
ERYTHROCYTE [SEDIMENTATION RATE] IN BLOOD: 71 MM/HR — HIGH (ref 0–20)
ERYTHROCYTE [SEDIMENTATION RATE] IN BLOOD: 71 MM/HR — HIGH (ref 0–20)
GLUCOSE BLDC GLUCOMTR-MCNC: 128 MG/DL — HIGH (ref 70–99)
GLUCOSE BLDC GLUCOMTR-MCNC: 128 MG/DL — HIGH (ref 70–99)

## 2023-12-25 PROCEDURE — 99239 HOSP IP/OBS DSCHRG MGMT >30: CPT

## 2023-12-25 PROCEDURE — 73120 X-RAY EXAM OF HAND: CPT

## 2023-12-25 PROCEDURE — 80053 COMPREHEN METABOLIC PANEL: CPT

## 2023-12-25 PROCEDURE — 86140 C-REACTIVE PROTEIN: CPT

## 2023-12-25 PROCEDURE — 96366 THER/PROPH/DIAG IV INF ADDON: CPT

## 2023-12-25 PROCEDURE — 85025 COMPLETE CBC W/AUTO DIFF WBC: CPT

## 2023-12-25 PROCEDURE — G0378: CPT

## 2023-12-25 PROCEDURE — 96376 TX/PRO/DX INJ SAME DRUG ADON: CPT

## 2023-12-25 PROCEDURE — 73110 X-RAY EXAM OF WRIST: CPT

## 2023-12-25 PROCEDURE — 99284 EMERGENCY DEPT VISIT MOD MDM: CPT | Mod: 25

## 2023-12-25 PROCEDURE — 85652 RBC SED RATE AUTOMATED: CPT

## 2023-12-25 PROCEDURE — 96365 THER/PROPH/DIAG IV INF INIT: CPT

## 2023-12-25 PROCEDURE — 82962 GLUCOSE BLOOD TEST: CPT

## 2023-12-25 RX ADMIN — AMPICILLIN SODIUM AND SULBACTAM SODIUM 200 GRAM(S): 250; 125 INJECTION, POWDER, FOR SUSPENSION INTRAMUSCULAR; INTRAVENOUS at 10:20

## 2023-12-25 RX ADMIN — AMPICILLIN SODIUM AND SULBACTAM SODIUM 200 GRAM(S): 250; 125 INJECTION, POWDER, FOR SUSPENSION INTRAMUSCULAR; INTRAVENOUS at 04:12

## 2023-12-25 RX ADMIN — CLOPIDOGREL BISULFATE 75 MILLIGRAM(S): 75 TABLET, FILM COATED ORAL at 10:20

## 2023-12-25 RX ADMIN — Medication 81 MILLIGRAM(S): at 10:20

## 2023-12-25 NOTE — ED CDU PROVIDER SUBSEQUENT DAY NOTE - HISTORY
Kimberly Baker PA-C: patient resting comfortably. offers no medical complaints at this time. VSS. will continue to monitor.

## 2023-12-25 NOTE — ED ADULT NURSE REASSESSMENT NOTE - NS ED NURSE REASSESS COMMENT FT1
12.00 Pt is evaluated by CDU MD Darwin Ahmadi . pt is feeling better.  Pt is discharged . Ml out  WILBUR Pérez   explained the follow up care & gave the discharge summary  . Pt has stable vitals steady gait A&OX 4 at the time of Discharge
07.00 Received the Pt from  GAVIOTA Joseph Pt is Observed for Lt wrist pain   . Received the Pt A&OX 4  Pt obeys commands Ronel N/V/D fever chills cp SOB   Comfort care & safety measures continued  IV site looks clean & dry no signs of infiltration noted pt denies  pain IV site .Pt is oriented to the unit Plan of care explained .  Pt is advised to call for help  call bell with in the reach pt verbalized the understanding .  pending CDU  MD edwards . GCS 15/15 A&OX 4 PERRLA  size 3 Strong upper & lower extremities steady gait  Pt is ambulatory & independent   No facial droop  No Hand Leg drop denies numbness tingling  Left wrist  swelling reduced ROM good & Pt states she feels better  Plan of care ongoing

## 2023-12-25 NOTE — ED ADULT NURSE REASSESSMENT NOTE - NSFALLUNIVINTERV_ED_ALL_ED
Bed/Stretcher in lowest position, wheels locked, appropriate side rails in place/Call bell, personal items and telephone in reach/Instruct patient to call for assistance before getting out of bed/chair/stretcher/Non-slip footwear applied when patient is off stretcher/[place] to call system/Physically safe environment - no spills, clutter or unnecessary equipment/Purposeful proactive rounding/Room/bathroom lighting operational, light cord in reach Bed/Stretcher in lowest position, wheels locked, appropriate side rails in place/Call bell, personal items and telephone in reach/Instruct patient to call for assistance before getting out of bed/chair/stretcher/Non-slip footwear applied when patient is off stretcher/[place] to call system/Physically safe environment - no spills, clutter or unnecessary equipment/Purposeful proactive rounding/Room/bathroom lighting operational, light cord in reach

## 2023-12-25 NOTE — ED CDU PROVIDER SUBSEQUENT DAY NOTE - PROGRESS NOTE DETAILS
Patient reevaluated this morning, endorses improvement of pain and swelling of the left wrist and hand.  Able to make full fist, wrist flexion and extension strength 5 out of 5.  Patient afebrile overnight. Spoke with orthopedics, okay for patient to be discharged on p.o. Augmentin with outpatient follow-up with Dr. Evans this week.  No need for inpatient ID evaluation as per their previous chart note.  ID evaluation outpt per Dr. Whitley's recommendation if she feels appropriate at that time.

## 2023-12-25 NOTE — ED CDU PROVIDER SUBSEQUENT DAY NOTE - WR ORDER ID 2
Regimen: Abatacept    Allyson Whatley NP is supervising provider today. Nursing Assessment: A focused nursing assessment  was performed and the patient reports the following:  Nausea: NO  Vomiting: NO  Fever: NO  Chills: NO  Other signs of infection: NO  Bleeding: NO  Mucositis: NO  Diarrhea: NO  Constipation: NO  Anorexia: NO  Dysuria: NO  Urinary Bleeding: NO  Cough: NO  Shortness of Breath: NO  Fatigue/Weakness: NO  Numbness/Tingling: NO  Other Neuropathies: NO  Edema: NO  Rash: NO  Hand/Foot Syndrome: NO  Anxiety/Depression/Insomnia: NO  Pain: NO    Pre-Treatment: - Patient has valid pre-authorization  - VS completed  - Height and weight verified  - Premed orders are verified prior to administration  - Treatment parameters verified in patient protocol  - Patient is identified by first & last name, Date of birth that has been verified with the patient chairside.  - Patient is identified by first & last name, Date of birth that has been verified by two practitioners with the patient chairside. Treatment: Refer to [facility] and MAR for line assessment and medication administration, Blood return confirmed before, during and after treatment administered and Infusion pump used for non-vesicant drugs    Post Treatment: Treatment tolerated well; no adverse reaction    Education: No new instructions needed    Next appointment scheduled: 4 weeks  Patient instructed to call the office with any questions or concerns.     Patient Discharged: patient discharged to home per self, ambulatory    Administrations This Visit     abatacept (ORENCIA) 750 mg in sodium chloride 0.9 % 100 mL IVPB     Admin Date  03/28/2022 Action  New Bag Dose  750 mg Rate  200 mL/hr Route  Intravenous Administered By  Stephan Caldwell RN          sodium chloride 0.9% infusion     Admin Date  03/28/2022 Action  New Bag Dose   Rate  100 mL/hr Route  Intravenous Administered By  Stephan Caldwell RN [id number]

## 2024-01-05 ENCOUNTER — APPOINTMENT (OUTPATIENT)
Dept: ORTHOPEDIC SURGERY | Facility: CLINIC | Age: 79
End: 2024-01-05
Payer: MEDICARE

## 2024-01-05 DIAGNOSIS — R22.32 LOCALIZED SWELLING, MASS AND LUMP, LEFT UPPER LIMB: ICD-10-CM

## 2024-01-05 PROCEDURE — 99203 OFFICE O/P NEW LOW 30 MIN: CPT

## 2024-01-05 PROCEDURE — 73130 X-RAY EXAM OF HAND: CPT | Mod: LT

## 2024-01-19 ENCOUNTER — APPOINTMENT (OUTPATIENT)
Dept: ENDOCRINOLOGY | Facility: CLINIC | Age: 79
End: 2024-01-19
Payer: MEDICARE

## 2024-01-19 VITALS
HEART RATE: 84 BPM | BODY MASS INDEX: 21.79 KG/M2 | OXYGEN SATURATION: 98 % | HEIGHT: 63 IN | SYSTOLIC BLOOD PRESSURE: 140 MMHG | WEIGHT: 123 LBS | DIASTOLIC BLOOD PRESSURE: 62 MMHG

## 2024-01-19 PROCEDURE — 95251 CONT GLUC MNTR ANALYSIS I&R: CPT

## 2024-01-19 PROCEDURE — 99214 OFFICE O/P EST MOD 30 MIN: CPT

## 2024-01-19 RX ORDER — SITAGLIPTIN AND METFORMIN HYDROCHLORIDE 50; 1000 MG/1; MG/1
50-1000 TABLET, FILM COATED ORAL TWICE DAILY
Qty: 180 | Refills: 3 | Status: ACTIVE | COMMUNITY
Start: 2024-01-19 | End: 1900-01-01

## 2024-01-19 NOTE — HISTORY OF PRESENT ILLNESS
[FreeTextEntry1] : CHIEF COMPLAINT: Type 2 DM   HISTORY OF PRESENTING ILLNESS: The patient is a 78 year old F being seen in the office today for evaluation of diabetes.  Also with hyperlipidemia.  Reports that she often goes to [place], where she eats a lot of rice as she cannot control what she is cooking.  She also misses medications and insulin doses while in [place], sometimes because of running out.  After restarting insulin upon returning to the [place] the last time, A1c from 3/2023 was 7.8%.  Soon after, she went to [place] and had to stay for about 4 to 5 months because of some family issues.  [place] in [place], A1c from 8/22/2023 was 12.4%.  Now improving when she has been back on medications.  DIABETES HPI:  Type of Diabetes: 2 Duration of Diabetes: >20 years ago  A1c: 8.3% 1/19/4  8.3% 11/17/2023 10.0% 10/9/2023 (HIE) 10.7% 9/7/2023 8/22/2023 12.4% 7.8%, 3/7/2023  Current home regimen:  -Lantus 7 units at bedtime -NovoLog 2 units before meals.  Usually takes it only twice daily. -Janumet 50/500 mg twice daily  Previous DM Meds: Prandin 0.5 mg before meals (stopped last visit as she resumed short acting insulin).  Diabetes complications:  Microvascular: No known neuropathy, nephropathy or retinopathy. Macrovascular: +Recently s/p PCI x 1 (10/2023), now on Plavix and baby aspirin. No known CVA or PAD.  Last retinopathy screening: June 2022, normal per patient Last nephropathy screening (urine ACR): Urine ACR negative 3/2023 Last foot exam/podiatry visit: Foot exam 3/20/2023  BG self monitoring: Freestyle saray 2 CGM review 1/6/2412 1/19/2024 Percentage CGM active 43% Time in range 59%, high 32%, very high 7%, low 2%, very low 0% Average glucose 163, GMI 7.2% Pattern: Postprandial hyperglycemia, rare fasting hypoglycemia She reports that she gets postprandial hyperglycemia with eating rice and a high carb load.  Diet: 3 meals per day, feels she is often still very hungry after meals breakfast (9am)- a regular slice of bread, half bagel with peanut butter, sandwich (tomatos, meat, avocado), 1 egg in morning lunch (1:30pm)- veggies, 2-3 spoons of rice, a little fish, doesn't like meat dinner (8:30-9pm)- salad, porridge, baked chicken snacks- nuts or almonds, if very hungry or BG around 120 before bedtime then may have fruits/cookies/bananas before bed drinks- mostly water, tea, coffee, no sugar, no soda or juice Exercise: Walks daily in the summertime, minimal exercise in the wintertime. Does yoga, taichi 2 days per week, and exercises 5-10 minutes a day at home.  Comorbidities:  Hyperlipidemia: Taking rosuvastatin 40 mg daily. F/b cardiologist.  Family Hx: Father and sisters with DM

## 2024-01-19 NOTE — ASSESSMENT
[FreeTextEntry1] : 1. Diabetes Mellitus Type: 2 A1c: 8.3% 1/19/2024 Current Regimen: Lantus 7 units at bedtime, NovoLog 2 units with meals, Janumet 50/500 twice daily  Previously noted low C-peptide (2020), and she was continued on Lantus. A1c very elevated when poorly compliant with insulin and medications, usually when she goes to [place]. Glycemic control much improved currently, as evident by saray readings over the last 2 weeks. Repeat c-peptide 1.4 (3/2023).   PLAN: - Regimen: Change Janumet to 50/1000 twice daily Decrease Lantus to 5 units at bedtime when changing Janumet Stop NovoLog after changing Janumet   Can consider titrating down insulin and readjusting/adding non-insulin medications at future visit once BG is more stable (ie. SGLT2i suggested per cardiology note) but should repeat labs prior.   - BG monitoring: Continue freestyle saray. High risk of hypoglycemia and hyperglycemia. Confirm with FS when in doubt. Call for any persisent highs or lows.  - Labs: Check urine ACR, CMP, C-peptide, lipid panel, TSH, vitamin B12  - Preventive:  Nephropathy screening: Urine ACR negative 3/7/2023  Retinopathy screening: June 2022 negative per patient  Foot exam/podiatrist: Foot exam 3/20/2023 within normal range  - Counseling: We discussed diabetes foot care, long term complications of diabetes including but not limited to neuropathy, nephropathy, retinopathy and cardiovascular disease and the benefits of good glycemic control in preventing said complications. We discussed the risks and benefits of diabetes medications and/or insulin as relevant for today, prevention and management of hypoglycemia, importance of medication compliance and blood glucose monitoring. -Discussed dietary modification, more protein-based snacks if patient is still hungry, carb-consistent diet.  2. Hyperlipidemia Last LDL: 97 from 10/9/2023 LDL goal: <100 - Continue rosuvastatin 40mg daily, f/b cardiology.  Return to clinic in 3 months with ZENAIDA Sexton, 6 months with me.  Milvia Mandujano MD [facility] at [address], [address] Ph: [phone] Fax: [phone]

## 2024-01-22 LAB
ALBUMIN SERPL ELPH-MCNC: 4.3 G/DL
ALP BLD-CCNC: 48 U/L
ALT SERPL-CCNC: 12 U/L
ANION GAP SERPL CALC-SCNC: 12 MMOL/L
AST SERPL-CCNC: 15 U/L
BILIRUB SERPL-MCNC: 0.3 MG/DL
BUN SERPL-MCNC: 15 MG/DL
C PEPTIDE SERPL-MCNC: 1.3 NG/ML
CALCIUM SERPL-MCNC: 9.7 MG/DL
CHLORIDE SERPL-SCNC: 102 MMOL/L
CHOLEST SERPL-MCNC: 172 MG/DL
CO2 SERPL-SCNC: 25 MMOL/L
CREAT SERPL-MCNC: 0.76 MG/DL
CREAT SPEC-SCNC: 84 MG/DL
EGFR: 81 ML/MIN/1.73M2
GLUCOSE SERPL-MCNC: 275 MG/DL
HDLC SERPL-MCNC: 76 MG/DL
LDLC SERPL CALC-MCNC: 82 MG/DL
MICROALBUMIN 24H UR DL<=1MG/L-MCNC: <1.2 MG/DL
MICROALBUMIN/CREAT 24H UR-RTO: NORMAL MG/G
NONHDLC SERPL-MCNC: 96 MG/DL
POTASSIUM SERPL-SCNC: 4.5 MMOL/L
PROT SERPL-MCNC: 7.4 G/DL
SODIUM SERPL-SCNC: 140 MMOL/L
TRIGL SERPL-MCNC: 81 MG/DL
TSH SERPL-ACNC: 3.11 UIU/ML
VIT B12 SERPL-MCNC: 779 PG/ML

## 2024-01-23 RX ORDER — METFORMIN ER 500 MG 500 MG/1
500 TABLET ORAL
Qty: 180 | Refills: 0 | Status: ACTIVE | COMMUNITY
Start: 2020-01-13 | End: 1900-01-01

## 2024-02-12 ENCOUNTER — APPOINTMENT (OUTPATIENT)
Dept: CARDIOLOGY | Facility: CLINIC | Age: 79
End: 2024-02-12

## 2024-02-15 ENCOUNTER — APPOINTMENT (OUTPATIENT)
Dept: CARDIOLOGY | Facility: CLINIC | Age: 79
End: 2024-02-15

## 2024-04-11 NOTE — ED CDU PROVIDER SUBSEQUENT DAY NOTE - NSICDXFAMILYHX_GEN_ALL_CORE_FT
FAMILY HISTORY:  No family history of cardiac disease    
unresponsive to verbal/unresponsive to pain

## 2024-04-18 ENCOUNTER — APPOINTMENT (OUTPATIENT)
Dept: ENDOCRINOLOGY | Facility: CLINIC | Age: 79
End: 2024-04-18
Payer: MEDICARE

## 2024-04-18 VITALS
HEIGHT: 63 IN | WEIGHT: 114 LBS | OXYGEN SATURATION: 99 % | HEART RATE: 86 BPM | SYSTOLIC BLOOD PRESSURE: 140 MMHG | DIASTOLIC BLOOD PRESSURE: 60 MMHG | BODY MASS INDEX: 20.2 KG/M2

## 2024-04-18 DIAGNOSIS — Z79.4 TYPE 2 DIABETES MELLITUS WITH OTHER SPECIFIED COMPLICATION: ICD-10-CM

## 2024-04-18 DIAGNOSIS — E11.69 TYPE 2 DIABETES MELLITUS WITH OTHER SPECIFIED COMPLICATION: ICD-10-CM

## 2024-04-18 PROCEDURE — 99214 OFFICE O/P EST MOD 30 MIN: CPT

## 2024-04-18 PROCEDURE — 95251 CONT GLUC MNTR ANALYSIS I&R: CPT

## 2024-04-18 NOTE — HISTORY OF PRESENT ILLNESS
[FreeTextEntry1] : CHIEF COMPLAINT: Type 2 DM   HISTORY OF PRESENTING ILLNESS: The patient is a 78 year old F being seen in the office today for evaluation of diabetes.  Also with hyperlipidemia.  Reports that she often goes to [place], where she eats a lot of rice as she cannot control what she is cooking.  She also misses medications and insulin doses while in [place], sometimes because of running out.  After restarting insulin upon returning to the [place] the last time, A1c from 3/2023 was 7.8%.  Soon after, she went to [place] and had to stay for about 4 to 5 months because of some family issues.  [place] in [place], A1c from 2023 was 12.4%.  Now improving when she has been back on medications.  DIABETES HPI:  Type of Diabetes: 2 Duration of Diabetes: >20 years ago  A1c: 8.3% 2024 7.8% 2024 8.3% 24  8.3% 2023 10.0% 10/9/2023 (HIE) 10.7% 2023 12.4% 7.8%, 3/7/2023  Current home regimen:  -Janumet  twice daily -Farxiga 5 mg daily, started around 3/2024.  Mild elevation in creatinine and calcium on labs from 2024 after starting Farxiga.  -Lantus -has not been taking, occasionally will take 4 units at bedtime -NovoLog -takes around 2 units for hyperglycemia more than 200s  Previous DM Meds: Prandin 0.5 mg before meals (stopped last visit as she resumed short acting insulin).  Diabetes complications:  Microvascular: No known neuropathy, nephropathy or retinopathy. Macrovascular: +Recently s/p PCI x 1 (10/2023), now on Plavix and baby aspirin. No known CVA or PAD.  Last retinopathy screenin, normal per patient.  She has cataracts Last nephropathy screening (urine ACR): Urine ACR negative 2024  BG self monitoring: Freestyle saray 2 CGM review 2024 to 2024 Percentage CGM active 79% Time in range 80%, high 19%, very high 1%, low or very low 0% Average glucose 149, GMI 6.9%.  Improvement from prior. Pattern: Overnight blood sugars are good.  She reports she gets hyperglycemia in the morning around 8 to 9 AM even before eating breakfast, blood sugars in the 200s.  Diet: 3 meals per day, feels she is often still very hungry after meals breakfast (9am)- a regular slice of bread, half bagel with peanut butter, sandwich (tomatos, meat, avocado), 1 egg in morning lunch (1:30pm)- veggies, 2-3 spoons of rice, a little fish, doesn't like meat dinner (8:30-9pm)- salad, porridge, baked chicken snacks- nuts or almonds, if very hungry or BG around 120 before bedtime then may have fruits/cookies/bananas before bed drinks- mostly water, tea, coffee, no sugar, no soda or juice Exercise: Walks daily in the summertime, minimal exercise in the wintertime. Does yoga, taichi 2 days per week, and exercises 5-10 minutes a day at home.  Comorbidities:  Hyperlipidemia: Taking rosuvastatin 40 mg daily. F/b cardiologist.  Family Hx: Father and sisters with DM

## 2024-04-18 NOTE — ASSESSMENT
[FreeTextEntry1] : 1. Diabetes Mellitus Type: 2 A1c: 8.3% 2024 Current Regimen: Janumet  mg twice daily, Farxiga 5 mg daily, NovoLog 2 units when hyperglycemic.  Previously noted low C-peptide (), and she was continued on Lantus. A1c very elevated when poorly compliant with insulin and medications, usually when she goes to [place]. Glycemic control much improved currently, as evident by saray readings over the last 2 weeks. Repeat c-peptide 1.4 (3/2023).   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  We discussed possible options for management, she would like to restart a small dose of Lantus to improve fasting sugars.  PLAN: - Regimen: Continue Janumet  twice daily Restart Lantus 4 units at bedtime Continue Farxiga 5 mg daily Can continue taking NovoLog 2 units if hyperglycemic to more than 200  - BG monitoring: Continue freestyle saray. High risk of hypoglycemia and hyperglycemia. Confirm with FS when in doubt. Call for any persisent highs or lows.  - Labs: Repeat A1c, CMP in 2 to 3 months.  Mild elevated creatinine and calcium noted in 2024 after starting Farxiga, will monitor for improvement.  We discussed that this mild creatinine elevation can be transient.  - Preventive:  Nephropathy screening: Urine ACR negative 2024  Retinopathy screenin negative per patient  2. Hyperlipidemia Last LDL: 76 from 2024 LDL goal: <100 - Continue rosuvastatin 40mg daily, f/b cardiology.  Return to clinic in 3 months with ZENAIDA Sexton, 6 months with me.  RTC with dietitian.  Milvia Mandujano MD [facility] at [address], [address] Ph: [phone] Fax: [phone]

## 2024-04-24 ENCOUNTER — APPOINTMENT (OUTPATIENT)
Dept: CARDIOLOGY | Facility: CLINIC | Age: 79
End: 2024-04-24
Payer: MEDICARE

## 2024-04-24 DIAGNOSIS — I25.10 ATHEROSCLEROTIC HEART DISEASE OF NATIVE CORONARY ARTERY W/OUT ANGINA PECTORIS: ICD-10-CM

## 2024-04-24 DIAGNOSIS — E78.5 HYPERLIPIDEMIA, UNSPECIFIED: ICD-10-CM

## 2024-04-24 PROCEDURE — 99215 OFFICE O/P EST HI 40 MIN: CPT

## 2024-04-24 PROCEDURE — G2211 COMPLEX E/M VISIT ADD ON: CPT

## 2024-04-26 ENCOUNTER — APPOINTMENT (OUTPATIENT)
Dept: ENDOCRINOLOGY | Facility: CLINIC | Age: 79
End: 2024-04-26

## 2024-04-29 ENCOUNTER — APPOINTMENT (OUTPATIENT)
Dept: CARDIOLOGY | Facility: CLINIC | Age: 79
End: 2024-04-29

## 2024-05-01 ENCOUNTER — APPOINTMENT (OUTPATIENT)
Dept: CARDIOLOGY | Facility: CLINIC | Age: 79
End: 2024-05-01

## 2024-05-02 ENCOUNTER — APPOINTMENT (OUTPATIENT)
Dept: CARDIOLOGY | Facility: CLINIC | Age: 79
End: 2024-05-02

## 2024-05-06 ENCOUNTER — APPOINTMENT (OUTPATIENT)
Dept: CARDIOLOGY | Facility: CLINIC | Age: 79
End: 2024-05-06

## 2024-05-08 ENCOUNTER — APPOINTMENT (OUTPATIENT)
Dept: CARDIOLOGY | Facility: CLINIC | Age: 79
End: 2024-05-08

## 2024-05-09 ENCOUNTER — APPOINTMENT (OUTPATIENT)
Dept: CARDIOLOGY | Facility: CLINIC | Age: 79
End: 2024-05-09

## 2024-05-13 ENCOUNTER — APPOINTMENT (OUTPATIENT)
Dept: CARDIOLOGY | Facility: CLINIC | Age: 79
End: 2024-05-13

## 2024-05-15 ENCOUNTER — APPOINTMENT (OUTPATIENT)
Dept: CARDIOLOGY | Facility: CLINIC | Age: 79
End: 2024-05-15

## 2024-05-16 ENCOUNTER — APPOINTMENT (OUTPATIENT)
Dept: CARDIOLOGY | Facility: CLINIC | Age: 79
End: 2024-05-16

## 2024-05-20 ENCOUNTER — APPOINTMENT (OUTPATIENT)
Dept: CARDIOLOGY | Facility: CLINIC | Age: 79
End: 2024-05-20

## 2024-05-22 ENCOUNTER — APPOINTMENT (OUTPATIENT)
Dept: CARDIOLOGY | Facility: CLINIC | Age: 79
End: 2024-05-22

## 2024-05-23 ENCOUNTER — APPOINTMENT (OUTPATIENT)
Dept: CARDIOLOGY | Facility: CLINIC | Age: 79
End: 2024-05-23

## 2024-05-29 ENCOUNTER — APPOINTMENT (OUTPATIENT)
Dept: CARDIOLOGY | Facility: CLINIC | Age: 79
End: 2024-05-29

## 2024-05-30 ENCOUNTER — APPOINTMENT (OUTPATIENT)
Dept: CARDIOLOGY | Facility: CLINIC | Age: 79
End: 2024-05-30

## 2024-06-03 ENCOUNTER — APPOINTMENT (OUTPATIENT)
Dept: CARDIOLOGY | Facility: CLINIC | Age: 79
End: 2024-06-03

## 2024-06-05 ENCOUNTER — APPOINTMENT (OUTPATIENT)
Dept: CARDIOLOGY | Facility: CLINIC | Age: 79
End: 2024-06-05

## 2024-06-06 ENCOUNTER — APPOINTMENT (OUTPATIENT)
Dept: CARDIOLOGY | Facility: CLINIC | Age: 79
End: 2024-06-06

## 2024-06-10 ENCOUNTER — APPOINTMENT (OUTPATIENT)
Dept: CARDIOLOGY | Facility: CLINIC | Age: 79
End: 2024-06-10

## 2024-06-12 ENCOUNTER — APPOINTMENT (OUTPATIENT)
Dept: CARDIOLOGY | Facility: CLINIC | Age: 79
End: 2024-06-12

## 2024-06-13 ENCOUNTER — APPOINTMENT (OUTPATIENT)
Dept: CARDIOLOGY | Facility: CLINIC | Age: 79
End: 2024-06-13

## 2024-06-17 ENCOUNTER — APPOINTMENT (OUTPATIENT)
Dept: CARDIOLOGY | Facility: CLINIC | Age: 79
End: 2024-06-17

## 2024-06-19 ENCOUNTER — APPOINTMENT (OUTPATIENT)
Dept: CARDIOLOGY | Facility: CLINIC | Age: 79
End: 2024-06-19

## 2024-06-20 ENCOUNTER — APPOINTMENT (OUTPATIENT)
Dept: CARDIOLOGY | Facility: CLINIC | Age: 79
End: 2024-06-20

## 2024-06-24 ENCOUNTER — APPOINTMENT (OUTPATIENT)
Dept: CARDIOLOGY | Facility: CLINIC | Age: 79
End: 2024-06-24

## 2024-06-26 ENCOUNTER — APPOINTMENT (OUTPATIENT)
Dept: CARDIOLOGY | Facility: CLINIC | Age: 79
End: 2024-06-26

## 2024-06-27 ENCOUNTER — APPOINTMENT (OUTPATIENT)
Dept: CARDIOLOGY | Facility: CLINIC | Age: 79
End: 2024-06-27

## 2024-07-01 ENCOUNTER — APPOINTMENT (OUTPATIENT)
Dept: CARDIOLOGY | Facility: CLINIC | Age: 79
End: 2024-07-01

## 2024-07-03 ENCOUNTER — APPOINTMENT (OUTPATIENT)
Dept: CARDIOLOGY | Facility: CLINIC | Age: 79
End: 2024-07-03

## 2024-07-08 ENCOUNTER — APPOINTMENT (OUTPATIENT)
Dept: CARDIOLOGY | Facility: CLINIC | Age: 79
End: 2024-07-08

## 2024-07-10 ENCOUNTER — APPOINTMENT (OUTPATIENT)
Dept: CARDIOLOGY | Facility: CLINIC | Age: 79
End: 2024-07-10

## 2024-07-11 ENCOUNTER — APPOINTMENT (OUTPATIENT)
Dept: CARDIOLOGY | Facility: CLINIC | Age: 79
End: 2024-07-11

## 2024-07-12 ENCOUNTER — APPOINTMENT (OUTPATIENT)
Dept: ENDOCRINOLOGY | Facility: CLINIC | Age: 79
End: 2024-07-12
Payer: MEDICARE

## 2024-07-12 DIAGNOSIS — E78.5 HYPERLIPIDEMIA, UNSPECIFIED: ICD-10-CM

## 2024-07-12 DIAGNOSIS — E11.69 TYPE 2 DIABETES MELLITUS WITH OTHER SPECIFIED COMPLICATION: ICD-10-CM

## 2024-07-12 DIAGNOSIS — Z79.4 TYPE 2 DIABETES MELLITUS WITH OTHER SPECIFIED COMPLICATION: ICD-10-CM

## 2024-07-12 PROCEDURE — 99214 OFFICE O/P EST MOD 30 MIN: CPT

## 2024-07-15 ENCOUNTER — APPOINTMENT (OUTPATIENT)
Dept: CARDIOLOGY | Facility: CLINIC | Age: 79
End: 2024-07-15

## 2024-07-17 ENCOUNTER — APPOINTMENT (OUTPATIENT)
Dept: CARDIOLOGY | Facility: CLINIC | Age: 79
End: 2024-07-17

## 2024-07-18 ENCOUNTER — APPOINTMENT (OUTPATIENT)
Dept: CARDIOLOGY | Facility: CLINIC | Age: 79
End: 2024-07-18

## 2024-07-22 ENCOUNTER — APPOINTMENT (OUTPATIENT)
Dept: CARDIOLOGY | Facility: CLINIC | Age: 79
End: 2024-07-22

## 2024-07-22 ENCOUNTER — APPOINTMENT (OUTPATIENT)
Dept: ENDOCRINOLOGY | Facility: CLINIC | Age: 79
End: 2024-07-22

## 2024-07-24 ENCOUNTER — APPOINTMENT (OUTPATIENT)
Dept: CARDIOLOGY | Facility: CLINIC | Age: 79
End: 2024-07-24

## 2024-07-25 NOTE — ED ADULT TRIAGE NOTE - HEIGHT IN CM
OPTICAL COHERENT TOMOGRAPHY       Indication: lamellar macular hole, left eye.             Right  Central macular thickness 273 microns     normal foveal contour and no intraretinal / subretinal fluid    Left  Central macular thickness 254 microns    Lamellar macular hole.    Impression:  Lamellar hole, left eye.  
160.02

## 2024-07-29 ENCOUNTER — APPOINTMENT (OUTPATIENT)
Dept: CARDIOLOGY | Facility: CLINIC | Age: 79
End: 2024-07-29

## 2024-09-25 ENCOUNTER — APPOINTMENT (OUTPATIENT)
Dept: CARDIOLOGY | Facility: CLINIC | Age: 79
End: 2024-09-25

## 2024-09-26 ENCOUNTER — APPOINTMENT (OUTPATIENT)
Dept: CARDIOLOGY | Facility: CLINIC | Age: 79
End: 2024-09-26

## 2024-10-02 ENCOUNTER — APPOINTMENT (OUTPATIENT)
Dept: CARDIOLOGY | Facility: CLINIC | Age: 79
End: 2024-10-02

## 2024-10-09 ENCOUNTER — APPOINTMENT (OUTPATIENT)
Dept: CARDIOLOGY | Facility: CLINIC | Age: 79
End: 2024-10-09

## 2024-10-10 ENCOUNTER — APPOINTMENT (OUTPATIENT)
Dept: CARDIOLOGY | Facility: CLINIC | Age: 79
End: 2024-10-10

## 2024-10-18 ENCOUNTER — APPOINTMENT (OUTPATIENT)
Dept: ENDOCRINOLOGY | Facility: CLINIC | Age: 79
End: 2024-10-18
Payer: MEDICARE

## 2024-10-18 VITALS
HEART RATE: 81 BPM | BODY MASS INDEX: 19.14 KG/M2 | HEIGHT: 63 IN | DIASTOLIC BLOOD PRESSURE: 72 MMHG | SYSTOLIC BLOOD PRESSURE: 124 MMHG | OXYGEN SATURATION: 98 % | WEIGHT: 108 LBS

## 2024-10-18 DIAGNOSIS — E78.5 HYPERLIPIDEMIA, UNSPECIFIED: ICD-10-CM

## 2024-10-18 DIAGNOSIS — E11.69 TYPE 2 DIABETES MELLITUS WITH OTHER SPECIFIED COMPLICATION: ICD-10-CM

## 2024-10-18 DIAGNOSIS — Z79.4 TYPE 2 DIABETES MELLITUS WITH OTHER SPECIFIED COMPLICATION: ICD-10-CM

## 2024-10-18 LAB
GLUCOSE BLDC GLUCOMTR-MCNC: 194
HBA1C MFR BLD HPLC: 8.3

## 2024-10-18 PROCEDURE — 82962 GLUCOSE BLOOD TEST: CPT

## 2024-10-18 PROCEDURE — 83036 HEMOGLOBIN GLYCOSYLATED A1C: CPT | Mod: QW

## 2024-10-18 PROCEDURE — 99214 OFFICE O/P EST MOD 30 MIN: CPT

## 2024-10-18 PROCEDURE — 95251 CONT GLUC MNTR ANALYSIS I&R: CPT

## 2024-10-21 ENCOUNTER — APPOINTMENT (OUTPATIENT)
Dept: CARDIOLOGY | Facility: CLINIC | Age: 79
End: 2024-10-21

## 2024-10-24 ENCOUNTER — APPOINTMENT (OUTPATIENT)
Dept: ENDOCRINOLOGY | Facility: CLINIC | Age: 79
End: 2024-10-24
Payer: MEDICARE

## 2024-10-24 PROCEDURE — G0108 DIAB MANAGE TRN  PER INDIV: CPT | Mod: 93

## 2024-11-14 ENCOUNTER — APPOINTMENT (OUTPATIENT)
Dept: ENDOCRINOLOGY | Facility: CLINIC | Age: 79
End: 2024-11-14
Payer: MEDICARE

## 2024-11-14 PROCEDURE — G0108 DIAB MANAGE TRN  PER INDIV: CPT | Mod: 93

## 2024-12-02 ENCOUNTER — DOCTOR'S OFFICE (OUTPATIENT)
Facility: LOCATION | Age: 79
Setting detail: OPHTHALMOLOGY
End: 2024-12-02
Payer: MEDICARE

## 2024-12-02 DIAGNOSIS — H40.053: ICD-10-CM

## 2024-12-02 DIAGNOSIS — E11.9: ICD-10-CM

## 2024-12-02 DIAGNOSIS — H25.12: ICD-10-CM

## 2024-12-02 DIAGNOSIS — H26.491: ICD-10-CM

## 2024-12-02 PROBLEM — H16.222 DRY EYE SYNDROME K SICCA;  , LEFT EYE: Status: ACTIVE | Noted: 2024-12-02

## 2024-12-02 PROCEDURE — 92133 CPTRZD OPH DX IMG PST SGM ON: CPT | Performed by: OPHTHALMOLOGY

## 2024-12-02 PROCEDURE — 92083 EXTENDED VISUAL FIELD XM: CPT | Performed by: OPHTHALMOLOGY

## 2024-12-02 PROCEDURE — 99214 OFFICE O/P EST MOD 30 MIN: CPT | Performed by: OPHTHALMOLOGY

## 2024-12-02 PROCEDURE — 92136 OPHTHALMIC BIOMETRY: CPT | Mod: 26,LT | Performed by: OPHTHALMOLOGY

## 2024-12-02 ASSESSMENT — REFRACTION_AUTOREFRACTION
OS_AXIS: 175
OD_SPHERE: -0.75
OS_CYLINDER: +1.25
OD_AXIS: 010
OS_SPHERE: +1.75
OD_CYLINDER: +0.50

## 2024-12-02 ASSESSMENT — REFRACTION_MANIFEST
OD_CYLINDER: +0.50
OD_CYLINDER: +1.25
OS_SPHERE: +1.50
OD_SPHERE: -0.25
OD_VA1: 20/30-2
OD_SPHERE: -0.50
OD_AXIS: 001
OD_CYLINDER: +0.75
OD_SPHERE: +0.50
OD_SPHERE: PLANO
OS_VA1: 20/20
OS_VA1: 20/20
OD_AXIS: 005
OS_AXIS: 165
OD_VA1: 20/60-2
OD_SPHERE: -0.50
OD_CYLINDER: +1.75
OS_AXIS: 176
OD_AXIS: 010
OD_SPHERE: -0.25
OD_VA1: 20/50-
OD_AXIS: 180
OS_SPHERE: +1.50
OS_VA1: 20/25-
OD_AXIS: 040
OD_AXIS: 180
OD_CYLINDER: +2.00
OD_AXIS: 040
OD_SPHERE: +1.75
OD_AXIS: 180
OD_VA1: 20/30+2
OD_VA1: 20/25-1
OS_VA1: 20/20-2
OS_AXIS: 170
OD_VA1: 20/70
OS_SPHERE: +2.25
OD_CYLINDER: +1.00
OS_CYLINDER: +1.00
OD_VA1: 20/30
OS_AXIS: 170
OS_CYLINDER: +0.50
OS_CYLINDER: +1.00
OS_SPHERE: +1.50
OS_AXIS: 175
OS_SPHERE: +1.75
OS_SPHERE: +1.50
OD_CYLINDER: +1.50
OS_AXIS: 165
OD_VA1: 20/25-1
OD_SPHERE: -0.75
OD_CYLINDER: +2.00
OD_CYLINDER: +2.00
OS_VA1: 20/20
OS_CYLINDER: +1.25
OD_SPHERE: -2.00
OD_VA1: 20/25-1
OD_AXIS: 005
OS_VA1: 20/20

## 2024-12-02 ASSESSMENT — PACHYMETRY
OS_CT_UM: 564
OD_CT_CORRECTION: -1
OD_CT_UM: 563
OS_CT_CORRECTION: -1

## 2024-12-02 ASSESSMENT — REFRACTION_CURRENTRX
OD_ADD: +2.25
OD_CYLINDER: +1.25
OD_SPHERE: +1.75
OD_AXIS: 008
OS_ADD: +2.25
OS_VPRISM_DIRECTION: PROGS
OS_SPHERE: +2.00
OS_AXIS: 166
OD_OVR_VA: 20/
OS_CYLINDER: +0.50
OS_OVR_VA: 20/
OD_VPRISM_DIRECTION: PROGS

## 2024-12-02 ASSESSMENT — VISUAL ACUITY
OD_BCVA: 20/80+1
OS_BCVA: 20/30-2

## 2024-12-02 ASSESSMENT — DRY EYES - PHYSICIAN NOTES: OS_GENERALCOMMENTS: SPK INFERIOR LIMBUS

## 2024-12-02 ASSESSMENT — LID EXAM ASSESSMENTS
OS_COMMENTS: SHORTENED MEIBOMIAN GLAND
OD_COMMENTS: SHORTENED MEIBOMIAN GLAND
OS_BLEPHARITIS: LUL T
OD_BLEPHARITIS: RUL T

## 2024-12-02 ASSESSMENT — KERATOMETRY
OD_K1POWER_DIOPTERS: 42.50
OD_K2POWER_DIOPTERS: 42.50
OS_AXISANGLE_DEGREES: 090
OS_K1POWER_DIOPTERS: 42.50
OS_K2POWER_DIOPTERS: 42.50
OD_AXISANGLE_DEGREES: 090
METHOD_AUTO_MANUAL: AUTO

## 2024-12-02 ASSESSMENT — CONFRONTATIONAL VISUAL FIELD TEST (CVF)
OD_FINDINGS: FULL
OS_FINDINGS: FULL

## 2024-12-02 ASSESSMENT — SUPERFICIAL PUNCTATE KERATITIS (SPK): OS_SPK: 1+

## 2024-12-05 ENCOUNTER — APPOINTMENT (OUTPATIENT)
Dept: ENDOCRINOLOGY | Facility: CLINIC | Age: 79
End: 2024-12-05

## 2025-01-24 ENCOUNTER — APPOINTMENT (OUTPATIENT)
Dept: ENDOCRINOLOGY | Facility: CLINIC | Age: 80
End: 2025-01-24

## 2025-05-01 ENCOUNTER — APPOINTMENT (OUTPATIENT)
Dept: ENDOCRINOLOGY | Facility: CLINIC | Age: 80
End: 2025-05-01

## 2025-07-14 ENCOUNTER — APPOINTMENT (OUTPATIENT)
Dept: ENDOCRINOLOGY | Facility: CLINIC | Age: 80
End: 2025-07-14

## 2025-08-14 ENCOUNTER — APPOINTMENT (OUTPATIENT)
Dept: ENDOCRINOLOGY | Facility: CLINIC | Age: 80
End: 2025-08-14
Payer: MEDICARE

## 2025-08-14 VITALS
HEART RATE: 85 BPM | WEIGHT: 109 LBS | HEIGHT: 63 IN | DIASTOLIC BLOOD PRESSURE: 80 MMHG | BODY MASS INDEX: 19.31 KG/M2 | OXYGEN SATURATION: 96 % | SYSTOLIC BLOOD PRESSURE: 128 MMHG

## 2025-08-14 DIAGNOSIS — E11.69 TYPE 2 DIABETES MELLITUS WITH OTHER SPECIFIED COMPLICATION: ICD-10-CM

## 2025-08-14 DIAGNOSIS — E78.5 HYPERLIPIDEMIA, UNSPECIFIED: ICD-10-CM

## 2025-08-14 DIAGNOSIS — Z79.4 TYPE 2 DIABETES MELLITUS WITH OTHER SPECIFIED COMPLICATION: ICD-10-CM

## 2025-08-14 LAB — HBA1C MFR BLD HPLC: 7.8

## 2025-08-14 PROCEDURE — 99214 OFFICE O/P EST MOD 30 MIN: CPT

## 2025-08-14 PROCEDURE — 95251 CONT GLUC MNTR ANALYSIS I&R: CPT

## 2025-08-14 PROCEDURE — 83036 HEMOGLOBIN GLYCOSYLATED A1C: CPT | Mod: QW

## 2025-08-15 LAB
25(OH)D3 SERPL-MCNC: 39.4 NG/ML
ALBUMIN SERPL ELPH-MCNC: 4.3 G/DL
ALBUMIN, RANDOM URINE: <1.2 MG/DL
ALP BLD-CCNC: 51 U/L
ALT SERPL-CCNC: 19 U/L
ANION GAP SERPL CALC-SCNC: 13 MMOL/L
AST SERPL-CCNC: 27 U/L
BILIRUB SERPL-MCNC: 0.2 MG/DL
BUN SERPL-MCNC: 25 MG/DL
CALCIUM SERPL-MCNC: 9.6 MG/DL
CHLORIDE SERPL-SCNC: 106 MMOL/L
CHOLEST SERPL-MCNC: 162 MG/DL
CO2 SERPL-SCNC: 22 MMOL/L
CREAT SERPL-MCNC: 0.91 MG/DL
CREAT SPEC-SCNC: 72 MG/DL
EGFRCR SERPLBLD CKD-EPI 2021: 65 ML/MIN/1.73M2
GLUCOSE SERPL-MCNC: 88 MG/DL
HDLC SERPL-MCNC: 83 MG/DL
LDLC SERPL-MCNC: 65 MG/DL
MICROALBUMIN/CREAT 24H UR-RTO: NORMAL MG/G
NONHDLC SERPL-MCNC: 80 MG/DL
POTASSIUM SERPL-SCNC: 5.5 MMOL/L
PROT SERPL-MCNC: 7.2 G/DL
SODIUM SERPL-SCNC: 140 MMOL/L
TRIGL SERPL-MCNC: 77 MG/DL
TSH SERPL-ACNC: 2.88 UIU/ML
VIT B12 SERPL-MCNC: 511 PG/ML

## 2025-08-26 ENCOUNTER — APPOINTMENT (OUTPATIENT)
Dept: ENDOCRINOLOGY | Facility: CLINIC | Age: 80
End: 2025-08-26